# Patient Record
Sex: MALE | Race: WHITE | NOT HISPANIC OR LATINO | Employment: UNEMPLOYED | URBAN - NONMETROPOLITAN AREA
[De-identification: names, ages, dates, MRNs, and addresses within clinical notes are randomized per-mention and may not be internally consistent; named-entity substitution may affect disease eponyms.]

---

## 2019-04-12 ENCOUNTER — OFFICE VISIT (OUTPATIENT)
Dept: FAMILY MEDICINE CLINIC | Facility: CLINIC | Age: 54
End: 2019-04-12

## 2019-04-12 VITALS
HEART RATE: 71 BPM | HEIGHT: 72 IN | RESPIRATION RATE: 16 BRPM | OXYGEN SATURATION: 98 % | WEIGHT: 188.9 LBS | TEMPERATURE: 97.1 F | SYSTOLIC BLOOD PRESSURE: 133 MMHG | BODY MASS INDEX: 25.58 KG/M2 | DIASTOLIC BLOOD PRESSURE: 78 MMHG

## 2019-04-12 DIAGNOSIS — G89.4 CHRONIC PAIN SYNDROME: Chronic | ICD-10-CM

## 2019-04-12 DIAGNOSIS — Z13.220 SCREENING FOR HYPERLIPIDEMIA: ICD-10-CM

## 2019-04-12 DIAGNOSIS — M54.41 CHRONIC MIDLINE LOW BACK PAIN WITH BILATERAL SCIATICA: Primary | Chronic | ICD-10-CM

## 2019-04-12 DIAGNOSIS — Z76.89 ESTABLISHING CARE WITH NEW DOCTOR, ENCOUNTER FOR: ICD-10-CM

## 2019-04-12 DIAGNOSIS — M43.22 CERVICAL VERTEBRAL FUSION: Chronic | ICD-10-CM

## 2019-04-12 DIAGNOSIS — F41.1 GENERALIZED ANXIETY DISORDER: ICD-10-CM

## 2019-04-12 DIAGNOSIS — G89.29 CHRONIC MIDLINE LOW BACK PAIN WITH BILATERAL SCIATICA: Primary | Chronic | ICD-10-CM

## 2019-04-12 DIAGNOSIS — M65.341 TRIGGER RING FINGER OF RIGHT HAND: Chronic | ICD-10-CM

## 2019-04-12 DIAGNOSIS — M47.819 OSTEOARTHRITIS OF SPINE AT MULTIPLE LEVELS: Chronic | ICD-10-CM

## 2019-04-12 DIAGNOSIS — Z12.5 SCREENING FOR PROSTATE CANCER: ICD-10-CM

## 2019-04-12 DIAGNOSIS — M54.42 CHRONIC MIDLINE LOW BACK PAIN WITH BILATERAL SCIATICA: Primary | Chronic | ICD-10-CM

## 2019-04-12 PROCEDURE — 96372 THER/PROPH/DIAG INJ SC/IM: CPT | Performed by: NURSE PRACTITIONER

## 2019-04-12 PROCEDURE — 99214 OFFICE O/P EST MOD 30 MIN: CPT | Performed by: NURSE PRACTITIONER

## 2019-04-12 RX ORDER — ETODOLAC 200 MG/1
200 CAPSULE ORAL EVERY 8 HOURS PRN
Qty: 90 CAPSULE | Refills: 2 | Status: SHIPPED | OUTPATIENT
Start: 2019-04-12 | End: 2020-12-15

## 2019-04-12 RX ORDER — METHYLPREDNISOLONE ACETATE 80 MG/ML
80 INJECTION, SUSPENSION INTRA-ARTICULAR; INTRALESIONAL; INTRAMUSCULAR; SOFT TISSUE ONCE
Status: COMPLETED | OUTPATIENT
Start: 2019-04-12 | End: 2019-04-12

## 2019-04-12 RX ORDER — KETOROLAC TROMETHAMINE 30 MG/ML
60 INJECTION, SOLUTION INTRAMUSCULAR; INTRAVENOUS ONCE
Status: COMPLETED | OUTPATIENT
Start: 2019-04-12 | End: 2019-04-12

## 2019-04-12 RX ADMIN — METHYLPREDNISOLONE ACETATE 80 MG: 80 INJECTION, SUSPENSION INTRA-ARTICULAR; INTRALESIONAL; INTRAMUSCULAR; SOFT TISSUE at 09:59

## 2019-04-12 RX ADMIN — KETOROLAC TROMETHAMINE 60 MG: 30 INJECTION, SOLUTION INTRAMUSCULAR; INTRAVENOUS at 09:58

## 2019-04-12 NOTE — PATIENT INSTRUCTIONS
Back xray and other labs at your convenience in this next week.    See me in 2 weeks for follow up.      You will receive a call for appointment with an orthopedist.

## 2019-04-12 NOTE — PROGRESS NOTES
"Chief Complaint   Patient presents with   • Establish Care     Hand pain, nerve issues lower back     Subjective   Serafin Nash is a 54 y.o. male who presents to the office to establish care. He presents with pain and limited ROM of fingers of right hand. Also with chronic lower back pain.   He admits he has smoked some marijuana as self medication for back pain. This is not doing the job and he is seeking chronic pain management. He admits that a drug screen today will show meth, which he uses three times per week to \"loosen up his joints\".  He is advised this is a disqualifier for controlled meds and wishes to proceed anyway with conservative care.       The following portions of the patient's history were reviewed and updated as appropriate: allergies, current medications, past family history, past medical history, past social history, past surgical history and problem list.    History of Present Illness     Trigger finger: Right hand pain onset nontraumatic about 5 months ago  right 4 th finger with trigger finger contracture and loss of ROM. He is a  in [employer] and uses his hands all shift, and this is interfering with  and function. That finger alone does hurt when he moves it. No redness, no swelling, no bony abnormality is seen. Pain is rated about 5/10.       Lower back pain:  Onset 18 months ago midline lumbar back, no particular spinal tenderness to palpation. This pain is constant. Worsened by twisting, lifting. Worsened by actions required of mopping. About 12 months ago began also to have bilateral sciatica. This shooting pain originates in mid back bilaterally and runs straight down the buttock and down the back of each thigh to knee. This pain is worsened sometimes by sitting.  Pain is rated at 7/10.    Past Medical History:   Diagnosis Date   • Anxiety    • Arthritis    • Depression    • GERD (gastroesophageal reflux disease)    • Headache    • Low back pain           Family History " "  Problem Relation Age of Onset   • Arthritis Mother    • Asthma Mother    • Diabetes Mother    • Hypertension Mother    • Alcohol abuse Father    • Hypertension Father    • Anxiety disorder Sister    • Arthritis Sister    • Asthma Sister    • Mental illness Sister    • Alcohol abuse Brother    • Hypertension Brother    • Stroke Brother    • Stroke Maternal Grandmother         Review of Systems   Constitutional: Negative.  Negative for fever and unexpected weight change.   HENT: Negative.    Eyes: Negative.    Respiratory: Negative.  Negative for cough, chest tightness and shortness of breath.    Cardiovascular: Negative.  Negative for chest pain.   Gastrointestinal: Negative.    Endocrine: Negative.    Genitourinary: Negative.  Negative for dysuria.   Musculoskeletal: Positive for arthralgias (right 4th finger), back pain (lumbar) and neck stiffness (fused C5-6).   Skin: Negative.  Negative for color change, pallor, rash and wound.   Allergic/Immunologic: Negative.    Neurological: Negative.    Hematological: Negative.    Psychiatric/Behavioral: Negative.  Negative for sleep disturbance and suicidal ideas.   All other systems reviewed and are negative.      Objective   Vitals:    04/12/19 0839   BP: 133/78   BP Location: Left arm   Patient Position: Sitting   Cuff Size: Adult   Pulse: 71   Resp: 16   Temp: 97.1 °F (36.2 °C)   TempSrc: Oral   SpO2: 98%   Weight: 85.7 kg (188 lb 14.4 oz)   Height: 181.6 cm (71.5\")   PainSc:   7   PainLoc: Generalized     Physical Exam   Constitutional: He is oriented to person, place, and time. He appears well-developed and well-nourished.   HENT:   Head: Normocephalic and atraumatic.   Eyes: Conjunctivae are normal. Pupils are equal, round, and reactive to light.   Neck: Normal range of motion. Neck supple.   Cardiovascular: Normal rate, regular rhythm, normal heart sounds and intact distal pulses. Exam reveals no gallop and no friction rub.   No murmur heard.  Pulmonary/Chest: " Effort normal and breath sounds normal. No respiratory distress. He has no wheezes. He has no rales. He exhibits no tenderness.   Abdominal: Soft. Bowel sounds are normal.   Musculoskeletal: Normal range of motion. He exhibits no edema, tenderness or deformity.   Lymphadenopathy:     He has no cervical adenopathy.   Neurological: He is alert and oriented to person, place, and time.   Skin: Skin is warm and dry. Capillary refill takes 2 to 3 seconds. No erythema. No pallor.   Psychiatric: He has a normal mood and affect. His behavior is normal. Judgment and thought content normal.   Nursing note and vitals reviewed.      Assessment/Plan   Serafin was seen today for establish care.    Diagnoses and all orders for this visit:    Chronic midline low back pain with bilateral sciatica  Comments:  onset 18 months ago, nontraumatic, osetoarthritis and DDD, further workup planned  Orders:  -     XR Spine Lumbar 4+ View  -     methylPREDNISolone acetate (DEPO-medrol) injection 80 mg  -     ketorolac (TORADOL) injection 60 mg    Trigger ring finger of right hand  Comments:  onset 5 months ago, needs ortho referral today.  Orders:  -     Ambulatory Referral to Orthopedic Surgery    Chronic pain syndrome  Comments:  onset over 18 months ago.    Osteoarthritis of spine at multiple levels  Comments:  onset over 18 months ago. DDD  Orders:  -     XR Spine Lumbar 4+ View    Cervical vertebral fusion  Comments:  2012    Establishing care with new doctor, encounter for  -     CBC & Differential  -     Comprehensive Metabolic Panel  -     Urine Drug Screen - Urine, Clean Catch    Generalized anxiety disorder  -     T4, Free  -     TSH  -     T3    Screening for prostate cancer  -     PSA Screen    Screening for hyperlipidemia  -     Lipid Panel    Other orders  -     etodolac (LODINE) 200 MG capsule; Take 1 capsule by mouth Every 8 (Eight) Hours As Needed (mild/mod back pain).           PHQ-2/PHQ-9 Depression Screening 4/12/2019   Little  interest or pleasure in doing things 1   Feeling down, depressed, or hopeless 1   Trouble falling or staying asleep, or sleeping too much 0   Feeling tired or having little energy 3   Poor appetite or overeating 3   Feeling bad about yourself - or that you are a failure or have let yourself or your family down 0   Trouble concentrating on things, such as reading the newspaper or watching television 0   Moving or speaking so slowly that other people could have noticed. Or the opposite - being so fidgety or restless that you have been moving around a lot more than usual 0   Thoughts that you would be better off dead, or of hurting yourself in some way 0   Total Score 8   If you checked off any problems, how difficult have these problems made it for you to do your work, take care of things at home, or get along with other people? Not difficult at all       IGNACIA Coleman         Return in about 1 week (around 4/19/2019) for Next scheduled follow up.    Patient Instructions   Back xray and other labs at your convenience in this next week.    See me in 2 weeks for follow up.      You will receive a call for appointment with an orthopedist.

## 2019-05-07 DIAGNOSIS — R52 PAIN: Primary | ICD-10-CM

## 2019-05-08 ENCOUNTER — OFFICE VISIT (OUTPATIENT)
Dept: ORTHOPEDIC SURGERY | Facility: CLINIC | Age: 54
End: 2019-05-08

## 2019-05-08 VITALS — WEIGHT: 183 LBS | BODY MASS INDEX: 24.79 KG/M2 | HEIGHT: 72 IN

## 2019-05-08 DIAGNOSIS — M65.341 TRIGGER RING FINGER OF RIGHT HAND: Primary | ICD-10-CM

## 2019-05-08 DIAGNOSIS — R20.0 NUMBNESS AND TINGLING IN BOTH HANDS: ICD-10-CM

## 2019-05-08 DIAGNOSIS — F17.210 HEAVY CIGARETTE SMOKER (20-39 PER DAY): ICD-10-CM

## 2019-05-08 DIAGNOSIS — R20.2 NUMBNESS AND TINGLING IN BOTH HANDS: ICD-10-CM

## 2019-05-08 DIAGNOSIS — M79.641 RIGHT HAND PAIN: ICD-10-CM

## 2019-05-08 PROCEDURE — 99203 OFFICE O/P NEW LOW 30 MIN: CPT | Performed by: ORTHOPAEDIC SURGERY

## 2019-05-08 PROCEDURE — 20550 NJX 1 TENDON SHEATH/LIGAMENT: CPT | Performed by: ORTHOPAEDIC SURGERY

## 2019-05-08 RX ORDER — IBUPROFEN 800 MG/1
800 TABLET ORAL EVERY 6 HOURS PRN
COMMUNITY
End: 2019-05-09 | Stop reason: SDUPTHER

## 2019-05-08 RX ORDER — LIDOCAINE 5% 5 G/100G
CREAM TOPICAL
COMMUNITY
End: 2020-12-15

## 2019-05-08 NOTE — PROGRESS NOTES
Serafin Nash is a 54 y.o. male   Primary provider:  Hollie Walden APRN       Chief Complaint   Patient presents with   • Right Hand - trigger finger     Ring finger.        HISTORY OF PRESENT ILLNESS: patient referred by Hollie Walden for right ring trigger finger. xrays done today. No previous treatment.   Patient states that finger started locking up about 7 months ago is gradually getting worse. Patient has numbness and tingling in hands.  No specific injury  Pain with gripping  Locking with increased activity  He wears a glove for compression - which helps to prevent the locking.  Pain worsening for about 7 months.    Has some general numbness and tingling in both hands      Upper Extremity Issue   This is a chronic problem. The current episode started more than 1 month ago. The problem occurs constantly. The problem has been unchanged. Associated symptoms include numbness. Associated symptoms comments: Grinding, stabbing, aching, burning, clicking, swelling. . Exacerbated by: driving.  He has tried NSAIDs for the symptoms.        CONCURRENT MEDICAL HISTORY:    Past Medical History:   Diagnosis Date   • Alcohol abuse    • Anxiety    • Arthritis    • Depression    • GERD (gastroesophageal reflux disease)    • Headache    • Low back pain    • Stomach ulcer        No Known Allergies      Current Outpatient Medications:   •  Lidocaine 5 % cream, Apply  topically., Disp: , Rfl:   •  etodolac (LODINE) 200 MG capsule, Take 1 capsule by mouth Every 8 (Eight) Hours As Needed (mild/mod back pain)., Disp: 90 capsule, Rfl: 2  •  ibuprofen (ADVIL,MOTRIN) 800 MG tablet, Take 1 tablet by mouth Every 6 (Six) Hours As Needed for Mild Pain ., Disp: 90 tablet, Rfl: 5    Past Surgical History:   Procedure Laterality Date   • APPENDECTOMY     • FOOT SURGERY     • NECK SURGERY  2012   • THUMB CARPOMETACARPAL JOINT ARTHROPLASTY FLEXOR CARPI RADIALIS TENDON     • TONSILLECTOMY         Family History   Problem Relation Age of  "Onset   • Arthritis Mother    • Asthma Mother    • Diabetes Mother    • Hypertension Mother    • Alcohol abuse Father    • Hypertension Father    • Anxiety disorder Sister    • Arthritis Sister    • Asthma Sister    • Mental illness Sister    • Alcohol abuse Brother    • Hypertension Brother    • Stroke Brother    • Stroke Maternal Grandmother         Social History     Socioeconomic History   • Marital status: Single     Spouse name: Not on file   • Number of children: Not on file   • Years of education: Not on file   • Highest education level: Not on file   Tobacco Use   • Smoking status: Current Every Day Smoker     Packs/day: 1.00   • Smokeless tobacco: Never Used   Substance and Sexual Activity   • Alcohol use: Yes     Alcohol/week: 7.2 oz     Types: 12 Cans of beer per week   • Drug use: No   • Sexual activity: Defer        Review of Systems   Constitutional: Positive for unexpected weight change.   Eyes: Positive for visual disturbance.   Cardiovascular: Positive for palpitations.   Neurological: Positive for numbness.   Psychiatric/Behavioral: Positive for dysphoric mood and sleep disturbance. The patient is nervous/anxious.    All other systems reviewed and are negative.      PHYSICAL EXAMINATION:       Ht 181.6 cm (71.5\")   Wt 83 kg (183 lb)   BMI 25.17 kg/m²     Physical Exam   Constitutional: He is oriented to person, place, and time. He appears well-developed and well-nourished.   Neurological: He is alert and oriented to person, place, and time.   Psychiatric: He has a normal mood and affect. His behavior is normal. Judgment and thought content normal.       GAIT:     [x]  Normal  []  Antalgic    Assistive device: [x]  None  []  Walker     []  Crutches  []  Cane     []  Wheelchair  []  Stretcher    Right Hand Exam     Tenderness   Right hand tenderness location: tender volar aspect mcp joint 4th finger.    Muscle Strength   : 4/5     Other   Erythema: absent  Sensation: normal  Pulse: " present    Comments:  Active locking of 4th finger with full flexion.      Left Hand Exam     Tenderness   The patient is experiencing no tenderness.     Range of Motion   The patient has normal left wrist ROM.    Muscle Strength   The patient has normal left wrist strength.    Other   Erythema: absent  Sensation: normal  Pulse: present              Xr Hand 3+ View Right    Result Date: 5/8/2019  Narrative: Ordering Provider:  Donovan Joya MD Ordering Diagnosis/Indication:  Pain Procedure:  XR HAND 3+ VW RIGHT Exam Date:  5/8/19 COMPARISON:  Not applicable, no relevant images available.     Impression:  3 views of the right hand show appropriate position and alignment of the hand with no evidence of acute bony abnormality.  No fracture or dislocation is noted.  Minimal arthritic changes noted diffusely throughout the hand. Donovan Joya MD 5/8/19     Xr Spine Lumbar 4+ View    Result Date: 5/8/2019  Narrative: PROCEDURE: Lumbar spine 5 views REASON FOR EXAM: Low Back Pain, M54.41 Lumbago with sciatica, right side M54.42 Lumbago with sciatica, left side G89.29 Other chronic pain M47.819 Spondylosis without myelopathy or radiculopathy, site unspecified FINDINGS: . Lumbar spine vertebral body heights and alignment are normal. There is no evidence of fracture or dislocation.Intervertebral disc spaces are intact.     Impression: 1. No acute lumbar spine abnormality.. Electronically signed by:  Joshua De Leon MD  5/8/2019 1:50 PM CDT Workstation: [id number]          ASSESSMENT:    Diagnoses and all orders for this visit:    Trigger ring finger of right hand  -     Injection Tendon or Ligament    Right hand pain  -     Injection Tendon or Ligament    Heavy cigarette smoker (20-39 per day)    Numbness and tingling in both hands    Other orders  -     Discontinue: ibuprofen (ADVIL,MOTRIN) 800 MG tablet; Take 800 mg by mouth Every 6 (Six) Hours As Needed for Mild Pain .  -     Lidocaine 5 % cream; Apply   topically.          PLAN    I advised Serafin of the risks of continuing to use tobacco.    During this visit, I spent 4 minutes counseling the patient regarding tobacco cessation.      Discussed injection into the flexor tendon sheath of 4th finger.    Activity as tolerated    Possible trigger release.    Possible EMG for eval of numbness and tingling but also discussed that most of these symptoms can be related to smoking and nicotine usage.        Injection Tendon or Ligament  Date/Time: 5/8/2019 10:24 AM  Performed by: Donovan Joya MD  Authorized by: Donovan Joya MD   Preparation: Patient was prepped and draped in the usual sterile fashion.  Local anesthesia used: no    Anesthesia:  Local anesthesia used: no    Sedation:  Patient sedated: no    Patient tolerance: Patient tolerated the procedure well with no immediate complications  Comments: .3cc 1% Lidocaine and .3cc kenalog injected into right ring finger  Lidocaine ndc 5900-5476-78 lot -dk  kenalong ndc 6154-9554-62 lot [id number]            Patient's Body mass index is 25.17 kg/m². BMI is within normal parameters. No follow-up required..    Return if symptoms worsen or fail to improve, for recheck.    Donovan Joya MD

## 2019-05-09 ENCOUNTER — TELEPHONE (OUTPATIENT)
Dept: FAMILY MEDICINE CLINIC | Facility: CLINIC | Age: 54
End: 2019-05-09

## 2019-05-09 RX ORDER — IBUPROFEN 800 MG/1
800 TABLET ORAL EVERY 6 HOURS PRN
Qty: 90 TABLET | Refills: 5 | Status: SHIPPED | OUTPATIENT
Start: 2019-05-09 | End: 2019-06-07

## 2019-05-09 NOTE — TELEPHONE ENCOUNTER
----- Message from IGNACIA Tafoya sent at 5/9/2019 12:53 PM CDT -----  Please notify pt that his lumbar back xray was normal, thanks clw

## 2020-12-15 ENCOUNTER — LAB (OUTPATIENT)
Dept: LAB | Facility: OTHER | Age: 55
End: 2020-12-15

## 2020-12-15 ENCOUNTER — OFFICE VISIT (OUTPATIENT)
Dept: FAMILY MEDICINE CLINIC | Facility: CLINIC | Age: 55
End: 2020-12-15

## 2020-12-15 VITALS
OXYGEN SATURATION: 100 % | HEIGHT: 71 IN | HEART RATE: 110 BPM | TEMPERATURE: 97.4 F | RESPIRATION RATE: 18 BRPM | DIASTOLIC BLOOD PRESSURE: 82 MMHG | WEIGHT: 222 LBS | BODY MASS INDEX: 31.08 KG/M2 | SYSTOLIC BLOOD PRESSURE: 158 MMHG

## 2020-12-15 DIAGNOSIS — I10 BENIGN ESSENTIAL HTN: ICD-10-CM

## 2020-12-15 DIAGNOSIS — M54.2 CHRONIC MIDLINE POSTERIOR NECK PAIN: Chronic | ICD-10-CM

## 2020-12-15 DIAGNOSIS — Z13.1 SCREENING FOR DIABETES MELLITUS: ICD-10-CM

## 2020-12-15 DIAGNOSIS — G89.29 CHRONIC BILATERAL LOW BACK PAIN WITH BILATERAL SCIATICA: Chronic | ICD-10-CM

## 2020-12-15 DIAGNOSIS — G89.29 CHRONIC MIDLINE POSTERIOR NECK PAIN: Chronic | ICD-10-CM

## 2020-12-15 DIAGNOSIS — Z13.220 SCREENING, LIPID: ICD-10-CM

## 2020-12-15 DIAGNOSIS — Z98.1 HISTORY OF FUSION OF CERVICAL SPINE: ICD-10-CM

## 2020-12-15 DIAGNOSIS — Z13.21 ENCOUNTER FOR VITAMIN DEFICIENCY SCREENING: ICD-10-CM

## 2020-12-15 DIAGNOSIS — G89.29 CHRONIC FOOT PAIN, RIGHT: ICD-10-CM

## 2020-12-15 DIAGNOSIS — M25.562 CHRONIC PAIN OF LEFT KNEE: ICD-10-CM

## 2020-12-15 DIAGNOSIS — Z02.83 ENCOUNTER FOR DRUG SCREENING: ICD-10-CM

## 2020-12-15 DIAGNOSIS — Z00.00 ENCOUNTER FOR ANNUAL HEALTH EXAMINATION: ICD-10-CM

## 2020-12-15 DIAGNOSIS — Z13.29 SCREENING FOR THYROID DISORDER: ICD-10-CM

## 2020-12-15 DIAGNOSIS — G89.29 CHRONIC PAIN OF LEFT KNEE: ICD-10-CM

## 2020-12-15 DIAGNOSIS — M15.9 OSTEOARTHRITIS INVOLVING MULTIPLE JOINTS ON BOTH SIDES OF BODY: ICD-10-CM

## 2020-12-15 DIAGNOSIS — F17.210 CIGARETTE NICOTINE DEPENDENCE WITHOUT COMPLICATION: Primary | Chronic | ICD-10-CM

## 2020-12-15 DIAGNOSIS — M54.41 CHRONIC BILATERAL LOW BACK PAIN WITH BILATERAL SCIATICA: Chronic | ICD-10-CM

## 2020-12-15 DIAGNOSIS — Z12.5 SCREENING FOR MALIGNANT NEOPLASM OF PROSTATE: ICD-10-CM

## 2020-12-15 DIAGNOSIS — R89.2 ABNORMAL DRUG SCREEN: ICD-10-CM

## 2020-12-15 DIAGNOSIS — M54.42 CHRONIC BILATERAL LOW BACK PAIN WITH BILATERAL SCIATICA: Chronic | ICD-10-CM

## 2020-12-15 DIAGNOSIS — M79.671 CHRONIC FOOT PAIN, RIGHT: ICD-10-CM

## 2020-12-15 LAB
ALBUMIN SERPL-MCNC: 4.3 G/DL (ref 3.5–5)
ALBUMIN/GLOB SERPL: 1.3 G/DL (ref 1.1–1.8)
ALP SERPL-CCNC: 78 U/L (ref 38–126)
ALT SERPL W P-5'-P-CCNC: 92 U/L
ANION GAP SERPL CALCULATED.3IONS-SCNC: 7 MMOL/L (ref 5–15)
AST SERPL-CCNC: 60 U/L (ref 17–59)
BILIRUB SERPL-MCNC: 1.1 MG/DL (ref 0.2–1.3)
BUN SERPL-MCNC: 11 MG/DL (ref 7–23)
BUN/CREAT SERPL: 13.8 (ref 7–25)
CALCIUM SPEC-SCNC: 10 MG/DL (ref 8.4–10.2)
CHLORIDE SERPL-SCNC: 101 MMOL/L (ref 101–112)
CHOLEST SERPL-MCNC: 191 MG/DL (ref 150–200)
CO2 SERPL-SCNC: 29 MMOL/L (ref 22–30)
CREAT SERPL-MCNC: 0.8 MG/DL (ref 0.7–1.3)
DEPRECATED RDW RBC AUTO: 43.8 FL (ref 37–54)
EOSINOPHIL # BLD MANUAL: 0.59 10*3/MM3 (ref 0–0.4)
EOSINOPHIL NFR BLD MANUAL: 6 % (ref 0.3–6.2)
ERYTHROCYTE [DISTWIDTH] IN BLOOD BY AUTOMATED COUNT: 13.2 % (ref 12.3–15.4)
GFR SERPL CREATININE-BSD FRML MDRD: 100 ML/MIN/1.73 (ref 56–130)
GLOBULIN UR ELPH-MCNC: 3.3 GM/DL (ref 2.3–3.5)
GLUCOSE SERPL-MCNC: 120 MG/DL (ref 70–99)
HCT VFR BLD AUTO: 49.8 % (ref 37.5–51)
HDLC SERPL-MCNC: 47 MG/DL (ref 40–59)
HGB BLD-MCNC: 16.7 G/DL (ref 13–17.7)
LDLC SERPL CALC-MCNC: 119 MG/DL
LDLC/HDLC SERPL: 2.46 {RATIO} (ref 0–3.55)
LYMPHOCYTES # BLD MANUAL: 2.58 10*3/MM3 (ref 0.7–3.1)
LYMPHOCYTES NFR BLD MANUAL: 14 % (ref 5–12)
LYMPHOCYTES NFR BLD MANUAL: 26 % (ref 19.6–45.3)
MCH RBC QN AUTO: 30.8 PG (ref 26.6–33)
MCHC RBC AUTO-ENTMCNC: 33.5 G/DL (ref 31.5–35.7)
MCV RBC AUTO: 91.9 FL (ref 79–97)
MONOCYTES # BLD AUTO: 1.39 10*3/MM3 (ref 0.1–0.9)
NEUTROPHILS # BLD AUTO: 5.25 10*3/MM3 (ref 1.7–7)
NEUTROPHILS NFR BLD MANUAL: 53 % (ref 42.7–76)
PLATELET # BLD AUTO: 257 10*3/MM3 (ref 140–450)
PMV BLD AUTO: 10.8 FL (ref 6–12)
POTASSIUM SERPL-SCNC: 4.2 MMOL/L (ref 3.4–5)
PROT SERPL-MCNC: 7.6 G/DL (ref 6.3–8.6)
RBC # BLD AUTO: 5.42 10*6/MM3 (ref 4.14–5.8)
RBC MORPH BLD: NORMAL
SMALL PLATELETS BLD QL SMEAR: ADEQUATE
SODIUM SERPL-SCNC: 137 MMOL/L (ref 137–145)
TRIGL SERPL-MCNC: 143 MG/DL
VARIANT LYMPHS NFR BLD MANUAL: 1 % (ref 0–5)
VLDLC SERPL-MCNC: 25 MG/DL (ref 5–40)
WBC # BLD AUTO: 9.91 10*3/MM3 (ref 3.4–10.8)
WBC MORPH BLD: NORMAL

## 2020-12-15 PROCEDURE — 84443 ASSAY THYROID STIM HORMONE: CPT | Performed by: NURSE PRACTITIONER

## 2020-12-15 PROCEDURE — 82306 VITAMIN D 25 HYDROXY: CPT | Performed by: NURSE PRACTITIONER

## 2020-12-15 PROCEDURE — G0481 DRUG TEST DEF 8-14 CLASSES: HCPCS | Performed by: NURSE PRACTITIONER

## 2020-12-15 PROCEDURE — 84439 ASSAY OF FREE THYROXINE: CPT | Performed by: NURSE PRACTITIONER

## 2020-12-15 PROCEDURE — 80307 DRUG TEST PRSMV CHEM ANLYZR: CPT | Performed by: NURSE PRACTITIONER

## 2020-12-15 PROCEDURE — 83036 HEMOGLOBIN GLYCOSYLATED A1C: CPT | Performed by: NURSE PRACTITIONER

## 2020-12-15 PROCEDURE — 82607 VITAMIN B-12: CPT | Performed by: NURSE PRACTITIONER

## 2020-12-15 PROCEDURE — 80061 LIPID PANEL: CPT | Performed by: NURSE PRACTITIONER

## 2020-12-15 PROCEDURE — 36415 COLL VENOUS BLD VENIPUNCTURE: CPT | Performed by: NURSE PRACTITIONER

## 2020-12-15 PROCEDURE — 99214 OFFICE O/P EST MOD 30 MIN: CPT | Performed by: NURSE PRACTITIONER

## 2020-12-15 PROCEDURE — 85025 COMPLETE CBC W/AUTO DIFF WBC: CPT | Performed by: NURSE PRACTITIONER

## 2020-12-15 PROCEDURE — 84480 ASSAY TRIIODOTHYRONINE (T3): CPT | Performed by: NURSE PRACTITIONER

## 2020-12-15 PROCEDURE — G0103 PSA SCREENING: HCPCS | Performed by: NURSE PRACTITIONER

## 2020-12-15 PROCEDURE — 80053 COMPREHEN METABOLIC PANEL: CPT | Performed by: NURSE PRACTITIONER

## 2020-12-15 RX ORDER — METOPROLOL SUCCINATE 25 MG/1
25 TABLET, EXTENDED RELEASE ORAL DAILY
Qty: 30 TABLET | Refills: 5 | Status: SHIPPED | OUTPATIENT
Start: 2020-12-15 | End: 2022-04-26 | Stop reason: SDUPTHER

## 2020-12-15 NOTE — PROGRESS NOTES
Chief Complaint   Patient presents with   • Back Pain     nerve pain that traveles to bothe feet   • Neck Pain     Subjective   Serafin Nash is a 55 y.o. male who presents to the office for routine follow-up of chronic neck and back pain managed with Lodine.     The following portions of the patient's history were reviewed and updated as appropriate: allergies, current medications, past family history, past medical history, past social history, past surgical history and problem list.    History of Present Illness   Patient has been lost to follow up for over a year. Due to Covid pandemic his job was eliminated. He is now interested in seeking better health. He stopped using meth several months ago, he reports.     HTN: in office today bp is 158/82, . No previous diagnosis of HTN and never been on antihypertensives.    Chronic lower back pain:  Onset over 5  years ago midline lumbar back, planes of bilateral sciatica.  This pain is constant. Worsened by twisting, lifting. Worsened by actions required of driving as well.  This shooting pain originates in mid back bilaterally and runs straight down the buttock and down the back of each thigh to knee. This pain is worsened sometimes by sitting.  Pain is rated at 6 of 10 today.  Usual treatment for his pain is over-the-counter NSAIDs. Reports mild to moderate relief with use of NSAID.    Chronic posterior cervical neck pain.  Onset prior to 2020, required anterior cervical fusion, brings films today demonstrating a plate in mid cervical spine, image date not identifiable. Has some discs as well which he is advised to keep unless an MRI is ordered here at some point, at which they will prove helpful..  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  Pain is intermittent. Exacerbated by twisting.     Intermittent bilateral and alternating knee pain. Left knee is hurting now for past 2 days, no specific injury. Pain is  subpatellar. No swelling, extraordinary heat nor redness. Crepitus felt both knees.    Chronic right foot pain onset before 2007. Foot surgery 2007, records recieved and placed in file today. Has had chronic pain since and requires orthotics. There was significant bone deformity noted.    New complaints today:none.    Parts of most recent relevant visit HPI, ROS  and PE may be carried forward and all are updated as appropriate for current situation.    Past Medical History:   Diagnosis Date   • Alcohol abuse    • Anxiety    • Arthritis    • Depression    • GERD (gastroesophageal reflux disease)    • Headache    • Low back pain    • Stomach ulcer           Family History   Problem Relation Age of Onset   • Arthritis Mother    • Asthma Mother    • Diabetes Mother    • Hypertension Mother    • Alcohol abuse Father    • Hypertension Father    • Anxiety disorder Sister    • Arthritis Sister    • Asthma Sister    • Mental illness Sister    • Alcohol abuse Brother    • Hypertension Brother    • Stroke Brother    • Stroke Maternal Grandmother         Review of Systems   Constitutional: Negative.  Negative for fever and unexpected weight change.   HENT: Negative.    Eyes: Negative.    Respiratory: Negative.  Negative for cough, chest tightness and shortness of breath.    Cardiovascular: Negative.  Negative for chest pain.   Gastrointestinal: Negative.    Endocrine: Negative.    Genitourinary: Negative.  Negative for dysuria.   Musculoskeletal: Positive for arthralgias (right 4th finger), back pain (lumbar) and neck stiffness (fused C5-6).   Skin: Negative.  Negative for color change, pallor, rash and wound.   Allergic/Immunologic: Negative.    Neurological: Negative.    Hematological: Negative.    Psychiatric/Behavioral: Negative.  Negative for sleep disturbance and suicidal ideas.   All other systems reviewed and are negative.      Objective   Vitals:    12/15/20 1406   BP: 158/82   Pulse: 110   Resp: 18   Temp: 97.4 °F  "(36.3 °C)   SpO2: 100%   Weight: 101 kg (222 lb)   Height: 180.3 cm (71\")   PainSc:   6   PainLoc: Back  Comment: + hips     Physical Exam  Vitals signs and nursing note reviewed.   Constitutional:       General: He is not in acute distress.  Pulmonary:      Effort: Pulmonary effort is normal. No respiratory distress.      Breath sounds: No stridor.   Neurological:      Mental Status: He is alert and oriented to person, place, and time.   Psychiatric:         Mood and Affect: Mood normal.         Behavior: Behavior normal.         Thought Content: Thought content normal.         Judgment: Judgment normal.         Assessment/Plan   Diagnoses and all orders for this visit:    1. Cigarette nicotine dependence without complication (Primary)    2. Chronic bilateral low back pain with bilateral sciatica  -     XR spine lumbar 2 or 3 vw    3. Chronic midline posterior neck pain  -     XR Spine Cervical 2 or 3 View    4. Screening for malignant neoplasm of prostate  -     PSA Screen    5. Encounter for vitamin deficiency screening  -     Vitamin D 25 hydroxy; Future  -     Vitamin B12; Future    6. Screening for diabetes mellitus  -     Hemoglobin A1c    7. Screening for thyroid disorder  -     T4, Free  -     TSH  -     T3    8. Screening, lipid  -     Lipid Panel    9. Encounter for annual health examination  -     CBC & Differential  -     Comprehensive Metabolic Panel  -     CBC Auto Differential    10. Chronic pain of left knee  -     XR Knee 3 View Left    11. Chronic foot pain, right  -     XR Foot 3+ View Right    12. History of fusion of cervical spine  -     XR Spine Cervical 2 or 3 View    13. Abnormal drug screen  -     Cancel: ToxASSURE Select 13 Discrete -    14. Benign essential HTN  -     metoprolol succinate XL (TOPROL-XL) 25 MG 24 hr tablet; Take 1 tablet by mouth Daily.  Dispense: 30 tablet; Refill: 5    15. Osteoarthritis involving multiple joints on both sides of body  -     diclofenac (VOLTAREN) 50 MG " EC tablet; Take 1 tablet by mouth 2 (Two) Times a Day As Needed (arthritis pain).  Dispense: 60 tablet; Refill: 5    16. Encounter for drug screening  -     ToxASSURE Select 13 Discrete -           PHQ-2/PHQ-9 Depression Screening 12/15/2020   Little interest or pleasure in doing things 0   Feeling down, depressed, or hopeless 1   Trouble falling or staying asleep, or sleeping too much 0   Feeling tired or having little energy 0   Poor appetite or overeating 0   Feeling bad about yourself - or that you are a failure or have let yourself or your family down 0   Trouble concentrating on things, such as reading the newspaper or watching television 0   Moving or speaking so slowly that other people could have noticed. Or the opposite - being so fidgety or restless that you have been moving around a lot more than usual 0   Thoughts that you would be better off dead, or of hurting yourself in some way 0   Total Score 1   If you checked off any problems, how difficult have these problems made it for you to do your work, take care of things at home, or get along with other people? Somewhat difficult       IGNACIA Coleman         Return in about 2 weeks (around 12/29/2020).    There are no Patient Instructions on file for this visit.

## 2020-12-16 LAB
25(OH)D3 SERPL-MCNC: 16.4 NG/ML (ref 30–100)
HBA1C MFR BLD: 5.8 % (ref 4.8–5.6)
PSA SERPL-MCNC: 0.96 NG/ML (ref 0–4)
T3 SERPL-MCNC: 150 NG/DL (ref 80–200)
T4 FREE SERPL-MCNC: 0.9 NG/DL (ref 0.93–1.7)
TSH SERPL DL<=0.05 MIU/L-ACNC: 1.33 UIU/ML (ref 0.27–4.2)
VIT B12 BLD-MCNC: 249 PG/ML (ref 211–946)

## 2020-12-17 DIAGNOSIS — E55.9 VITAMIN D DEFICIENCY: Primary | ICD-10-CM

## 2020-12-17 NOTE — PROGRESS NOTES
Inform patient of low vitamin D level. Requires a weekly vitamin D supplement, prescription sent.  Repeat vitamin D level on approximately 3/17/2021.

## 2020-12-20 LAB
6MAM UR QL CFM: NEGATIVE
6MAM/CREAT UR: NOT DETECTED NG/MG CREAT
7AMINOCLONAZEPAM/CREAT UR: NOT DETECTED NG/MG CREAT
A-OH ALPRAZ/CREAT UR: NOT DETECTED NG/MG CREAT
A-OH-TRIAZOLAM/CREAT UR CFM: NOT DETECTED NG/MG CREAT
ALFENTANIL/CREAT UR CFM: NOT DETECTED NG/MG CREAT
ALPHA-HYDROXYMIDAZOLAM, URINE: NOT DETECTED NG/MG CREAT
ALPRAZ/CREAT UR CFM: NOT DETECTED NG/MG CREAT
AMOBARBITAL UR QL CFM: NOT DETECTED
AMPHET/CREAT UR: NOT DETECTED NG/MG CREAT
AMPHETAMINES UR QL CFM: NORMAL
BARBITAL UR QL CFM: NOT DETECTED
BARBITURATES UR QL CFM: NEGATIVE
BENZODIAZ UR QL CFM: NEGATIVE
BUPRENORPHINE UR QL CFM: NEGATIVE
BUPRENORPHINE/CREAT UR: NOT DETECTED NG/MG CREAT
BUTABARBITAL UR QL CFM: NOT DETECTED
BUTALBITAL UR QL CFM: NOT DETECTED
BZE/CREAT UR: NOT DETECTED NG/MG CREAT
CANNABINOIDS UR QL CFM: NORMAL
CARBOXYTHC/CREAT UR: 25 NG/MG CREAT
CLONAZEPAM/CREAT UR CFM: NOT DETECTED NG/MG CREAT
COCAETHYLENE/CREAT UR CFM: NOT DETECTED NG/MG CREAT
COCAINE UR QL CFM: NEGATIVE
COCAINE/CREAT UR CFM: NOT DETECTED NG/MG CREAT
CODEINE/CREAT UR: NOT DETECTED NG/MG CREAT
CREAT UR-MCNC: 204 MG/DL
DESALKYLFLURAZ/CREAT UR: NOT DETECTED NG/MG CREAT
DESMETHYLFLUNITRAZEPAM: NOT DETECTED NG/MG CREAT
DHC/CREAT UR: NOT DETECTED NG/MG CREAT
DIAZEPAM/CREAT UR: NOT DETECTED NG/MG CREAT
DRUGS UR: NORMAL
EDDP/CREAT UR: NOT DETECTED NG/MG CREAT
ETHANOL UR CFM-MCNC: NOT DETECTED G/DL
ETHANOL UR QL CFM: NEGATIVE
FENTANYL UR QL CFM: NEGATIVE
FENTANYL/CREAT UR: NOT DETECTED NG/MG CREAT
FLUNITRAZEPAM UR QL CFM: NOT DETECTED NG/MG CREAT
HYDROCODONE/CREAT UR: NOT DETECTED NG/MG CREAT
HYDROMORPHONE/CREAT UR: NOT DETECTED NG/MG CREAT
LEVEL OF DETECTION:: NORMAL
LORAZEPAM/CREAT UR: NOT DETECTED NG/MG CREAT
MDA/CREAT UR: NOT DETECTED NG/MG CREAT
MDMA/CREAT UR: NOT DETECTED NG/MG CREAT
MEPHOBARBITAL UR QL CFM: NOT DETECTED
METHADONE UR QL CFM: NEGATIVE
METHADONE/CREAT UR: NOT DETECTED NG/MG CREAT
METHAMPHET/CREAT UR: 46 NG/MG CREAT
MIDAZOLAM/CREAT UR CFM: NOT DETECTED NG/MG CREAT
MORPHINE/CREAT UR: NOT DETECTED NG/MG CREAT
N-NORTRAMADOL/CREAT UR CFM: NOT DETECTED NG/MG CREAT
NARCOTICS UR: NEGATIVE
NORBUPRENORPHINE/CREAT UR: NOT DETECTED NG/MG CREAT
NORCODEINE/CREAT UR CFM: NOT DETECTED NG/MG CREAT
NORDIAZEPAM/CREAT UR: NOT DETECTED NG/MG CREAT
NORFENTANYL/CREAT UR: NOT DETECTED NG/MG CREAT
NORHYDROCODONE/CREAT UR: NOT DETECTED NG/MG CREAT
NORMORPHINE UR-MCNC: NOT DETECTED NG/MG CREAT
NOROXYCODONE/CREAT UR: NOT DETECTED NG/MG CREAT
NOROXYMORPHONE/CREAT UR CFM: NOT DETECTED NG/MG CREAT
O-NORTRAMADOL UR CFM-MCNC: NOT DETECTED NG/MG CREAT
OPIATES UR QL CFM: NEGATIVE
OXAZEPAM/CREAT UR: NOT DETECTED NG/MG CREAT
OXYCODONE UR QL CFM: NEGATIVE
OXYCODONE/CREAT UR: NOT DETECTED NG/MG CREAT
OXYMORPHONE/CREAT UR: NOT DETECTED NG/MG CREAT
PENTOBARB UR QL CFM: NOT DETECTED
PHENOBARB UR QL CFM: NOT DETECTED
SECOBARBITAL UR QL CFM: NOT DETECTED
SUFENTANIL/CREAT UR CFM: NOT DETECTED NG/MG CREAT
TAPENTADOL UR QL CFM: NEGATIVE
TAPENTADOL/CREAT UR: NOT DETECTED NG/MG CREAT
TEMAZEPAM/CREAT UR: NOT DETECTED NG/MG CREAT
THIOPENTAL UR QL CFM: NOT DETECTED
TRAMADOL UR QL CFM: NOT DETECTED NG/MG CREAT

## 2020-12-29 ENCOUNTER — OFFICE VISIT (OUTPATIENT)
Dept: FAMILY MEDICINE CLINIC | Facility: CLINIC | Age: 55
End: 2020-12-29

## 2020-12-29 VITALS — HEIGHT: 71 IN | BODY MASS INDEX: 31.36 KG/M2 | WEIGHT: 224 LBS

## 2020-12-29 DIAGNOSIS — E78.00 ELEVATED LOW DENSITY LIPOPROTEIN (LDL) CHOLESTEROL LEVEL: ICD-10-CM

## 2020-12-29 DIAGNOSIS — M54.42 CHRONIC BILATERAL LOW BACK PAIN WITH BILATERAL SCIATICA: ICD-10-CM

## 2020-12-29 DIAGNOSIS — R73.03 PREDIABETES: ICD-10-CM

## 2020-12-29 DIAGNOSIS — R79.89 ELEVATED LFTS: ICD-10-CM

## 2020-12-29 DIAGNOSIS — M15.9 PRIMARY OSTEOARTHRITIS INVOLVING MULTIPLE JOINTS: ICD-10-CM

## 2020-12-29 DIAGNOSIS — M25.552 CHRONIC HIP PAIN, BILATERAL: ICD-10-CM

## 2020-12-29 DIAGNOSIS — R79.89 LOW SERUM T4 LEVEL: ICD-10-CM

## 2020-12-29 DIAGNOSIS — M25.562 CHRONIC PAIN OF LEFT KNEE: ICD-10-CM

## 2020-12-29 DIAGNOSIS — E55.9 VITAMIN D DEFICIENCY: ICD-10-CM

## 2020-12-29 DIAGNOSIS — G89.29 CHRONIC HIP PAIN, BILATERAL: ICD-10-CM

## 2020-12-29 DIAGNOSIS — G89.29 CHRONIC MIDLINE POSTERIOR NECK PAIN: ICD-10-CM

## 2020-12-29 DIAGNOSIS — M25.551 CHRONIC HIP PAIN, BILATERAL: ICD-10-CM

## 2020-12-29 DIAGNOSIS — E53.8 B12 DEFICIENCY: Primary | ICD-10-CM

## 2020-12-29 DIAGNOSIS — G89.29 CHRONIC BILATERAL LOW BACK PAIN WITH BILATERAL SCIATICA: ICD-10-CM

## 2020-12-29 DIAGNOSIS — M54.2 CHRONIC MIDLINE POSTERIOR NECK PAIN: ICD-10-CM

## 2020-12-29 DIAGNOSIS — M54.41 CHRONIC BILATERAL LOW BACK PAIN WITH BILATERAL SCIATICA: ICD-10-CM

## 2020-12-29 DIAGNOSIS — G89.29 CHRONIC PAIN OF LEFT KNEE: ICD-10-CM

## 2020-12-29 PROCEDURE — 99443 PR PHYS/QHP TELEPHONE EVALUATION 21-30 MIN: CPT | Performed by: NURSE PRACTITIONER

## 2020-12-29 RX ORDER — PANTOPRAZOLE SODIUM 40 MG/1
40 TABLET, DELAYED RELEASE ORAL DAILY
Qty: 30 TABLET | Refills: 5 | Status: SHIPPED | OUTPATIENT
Start: 2020-12-29 | End: 2022-05-24 | Stop reason: SDUPTHER

## 2020-12-29 RX ORDER — ATORVASTATIN CALCIUM 10 MG/1
10 TABLET, FILM COATED ORAL DAILY
Qty: 30 TABLET | Refills: 5 | Status: SHIPPED | OUTPATIENT
Start: 2020-12-29 | End: 2022-05-24 | Stop reason: SDUPTHER

## 2020-12-29 RX ORDER — MELOXICAM 15 MG/1
15 TABLET ORAL DAILY
Qty: 30 TABLET | Refills: 0 | Status: SHIPPED | OUTPATIENT
Start: 2020-12-29 | End: 2022-05-06

## 2020-12-29 NOTE — PROGRESS NOTES
Subjective   Serafin Nash is a 55 y.o. male.   You have chosen to receive care through a telephone visit. Do you consent to use a telephone visit for your medical care today? Yes  30 minutes medical discussion.     Chief Complaint   Patient presents with   • Follow-up     2 wk fu    • lab results     xray results  & UDS        History of Present Illness   Discussed labs from 12/15/20. Normal CBC, T3, TSH, PSA. CMP with elevated LFTs, lipoid with .A1C 5.8%. Vitamin D 16.4. B12 249. Other labs normal/ stable.   Initial screening UDS showed THC and methamphetamine. No controlled meds will be prescribed.     Hypertension: Prescribed Toprol 25 mg p.o. daily at last visit.  Today he reports stopped his Toprol.  Reports it dropped his blood pressure really low-100/56.  Explained that this is a normal blood pressure.  He was asymptomatic and did not feel bad with a blood pressure at that level.  He is encouraged and reassured to resume the medication and he states he will.    Chronic midline lower back pain with bilateral sciatica: Gradually worsening over time.  Onset more than 1 year ago.  Plain films do not demonstrate any abnormality.  Explained that insurance will require a 6-week treatment conservatively prior to MRI.  He states understanding.  Reports diclofenac is like taking a baby aspirin does not help at all.    Chronic left knee pain, gradually worsening over time.  Onset more than 1 year ago.  Plain film also does not demonstrate any degenerative joint disease of the knee.  Declines referral to orthopedics for second opinion.    Chronic cervical neck pain, gradually worsening over time.  History of multiple cervical vertebral fusions.  Plain film demonstrates anterior cervical fusion with multilevel degenerative disease and spurring.    Vitamin D deficiency identified on labs 12/15/2020 and cholecalciferol 50,000 units 1 every 7 days was prescribed at that time.  Patient reports pharmacy never  received the prescription so this is resent again today.    Vitamin B12 deficiency, new diagnosis from labs of 12/15/2020..  Oral B12 prescription is sent.    Overarching osteoarthritic changes of multiple joints affecting hips and neck at minimum fail improvement with treatment with diclofenac 50 mg EC tablets twice daily.  Agreeable to try Mobic 15 mg daily.  Educated on the fact that prescription strength NSAIDs can lead to NSAID induced gastric ulceration, and PPI therapy is required.  Patient agrees to this as well.    Risk of CAD: Male gender, obesity, age greater than 55, hypertension, , family history.  Patient states has had 2 AMIs in his 20s related to cocaine use.  There is no recent or current stress test EKG or echocardiogram on file.  He does not take a daily baby aspirin he is not on statin therapy.  He is on a beta-blocker.    Prediabetes: New diagnosis with 12/15/2020 labs and A1c of 5.8%.  He is not interested in taking Metformin at this time we will retest with dietary changes over the next 3 months, obtaining retest at the end of March 2021.  Discussed healthy dietary changes limiting carbs to 60 g per meal 15 g per snack not more than 3 times daily, cutting out sugary drinks and concentrated sweets.    Other complaints today: None.    Parts of most recent relevant visit HPI, ROS  and PE may be carried forward and all are updated as appropriate for current situation.        New complaint today:Continuous chronic bilateral hip pain, grinding. Rated at 6/10 today. Gradually worsening over time. Onset over 2 years ago. OTC NSAIDs are not helping. Diclofenac is not helping.     Past Surgical History:   Procedure Laterality Date   • APPENDECTOMY     • FOOT SURGERY     • NECK SURGERY  2012   • THUMB CARPOMETACARPAL JOINT ARTHROPLASTY FLEXOR CARPI RADIALIS TENDON     • TONSILLECTOMY        Social History     Socioeconomic History   • Marital status: Single     Spouse name: Not on file   • Number  of children: Not on file   • Years of education: Not on file   • Highest education level: Not on file   Tobacco Use   • Smoking status: Current Every Day Smoker     Packs/day: 1.00   • Smokeless tobacco: Never Used   Substance and Sexual Activity   • Alcohol use: Yes     Alcohol/week: 12.0 standard drinks     Types: 12 Cans of beer per week   • Drug use: No   • Sexual activity: Defer      The following portions of the patient's history were reviewed and updated as appropriate: He  has a past medical history of Alcohol abuse, Anxiety, Arthritis, Depression, GERD (gastroesophageal reflux disease), Headache, Low back pain, and Stomach ulcer..    Review of Systems   Constitutional: Negative.  Negative for activity change, appetite change, chills, fever and unexpected weight loss.   HENT: Negative.  Negative for ear pain, postnasal drip, rhinorrhea, sinus pressure, sneezing, sore throat, swollen glands, trouble swallowing and voice change.    Eyes: Negative.  Negative for discharge, redness, itching and visual disturbance.   Respiratory: Negative.  Negative for cough, shortness of breath and wheezing.    Cardiovascular: Negative.  Negative for chest pain and leg swelling.   Gastrointestinal: Negative.    Endocrine: Negative.  Negative for polydipsia, polyphagia and polyuria.   Genitourinary: Negative.  Negative for dysuria.   Musculoskeletal: Positive for arthralgias, back pain, neck pain and neck stiffness.   Allergic/Immunologic: Negative.    Neurological: Negative.    Hematological: Negative.    Psychiatric/Behavioral: Negative.  Negative for behavioral problems, dysphoric mood, sleep disturbance, suicidal ideas and depressed mood. The patient is not nervous/anxious.    All other systems reviewed and are negative.    PHQ-9 Depression Screening  Little interest or pleasure in doing things?     Feeling down, depressed, or hopeless?     Trouble falling or staying asleep, or sleeping too much?     Feeling tired or having  little energy?     Poor appetite or overeating?     Feeling bad about yourself - or that you are a failure or have let yourself or your family down?     Trouble concentrating on things, such as reading the newspaper or watching television?     Moving or speaking so slowly that other people could have noticed? Or the opposite - being so fidgety or restless that you have been moving around a lot more than usual?     Thoughts that you would be better off dead, or of hurting yourself in some way?     PHQ-9 Total Score     If you checked off any problems, how difficult have these problems made it for you to do your work, take care of things at home, or get along with other people?        Objective   Physical Exam  Vitals signs and nursing note reviewed.   Constitutional:       General: He is not in acute distress.     Appearance: He is obese.   Pulmonary:      Effort: Pulmonary effort is normal. No respiratory distress.      Breath sounds: No stridor.   Neurological:      Mental Status: He is alert and oriented to person, place, and time. Mental status is at baseline.   Psychiatric:         Mood and Affect: Mood normal.         Behavior: Behavior normal.         Thought Content: Thought content normal.         Judgment: Judgment normal.           Assessment/Plan   Diagnoses and all orders for this visit:    1. B12 deficiency (Primary)  -     vitamin B-12 (CYANOCOBALAMIN) 250 MCG tablet; Take 1 tablet by mouth Daily. For B12 deficiency  Dispense: 30 tablet; Refill: 5  -     Vitamin B12 & Folate; Future    2. Vitamin D deficiency  -     cholecalciferol (VITAMIN D3) 1.25 MG (14477 UT) capsule; Take 1 capsule by mouth Every 7 (Seven) Days. Low vitamin D level  Dispense: 12 capsule; Refill: 1  -     Vitamin D 25 Hydroxy; Future    3. Elevated low density lipoprotein (LDL) cholesterol level  -     atorvastatin (LIPITOR) 10 MG tablet; Take 1 tablet by mouth Daily. For cholesterol  Dispense: 30 tablet; Refill: 5  -     Lipid  Panel With LDL/HDL Ratio; Future    4. Low serum T4 level  -     T4; Future  -     TSH; Future    5. Elevated LFTs  -     Hepatic function panel; Future    6. Chronic hip pain, bilateral  -     XR Hips Bilateral With or Without Pelvis 2 View; Future    7. Primary osteoarthritis involving multiple joints  -     meloxicam (Mobic) 15 MG tablet; Take 1 tablet by mouth Daily. For arthritis pain  Dispense: 30 tablet; Refill: 0    8. Chronic midline posterior neck pain  -     meloxicam (Mobic) 15 MG tablet; Take 1 tablet by mouth Daily. For arthritis pain  Dispense: 30 tablet; Refill: 0    9. Chronic bilateral low back pain with bilateral sciatica  -     meloxicam (Mobic) 15 MG tablet; Take 1 tablet by mouth Daily. For arthritis pain  Dispense: 30 tablet; Refill: 0    10. Chronic pain of left knee  -     meloxicam (Mobic) 15 MG tablet; Take 1 tablet by mouth Daily. For arthritis pain  Dispense: 30 tablet; Refill: 0    11. Prediabetes  -     Hemoglobin A1c; Future    Other orders  -     pantoprazole (PROTONIX) 40 MG EC tablet; Take 1 tablet by mouth Daily.  Dispense: 30 tablet; Refill: 5      Return in about 1 week (around 1/5/2021).               This document has been electronically signed by IGNACIA Coleman on December 29, 2020 16:06 CST

## 2021-01-04 DIAGNOSIS — M16.0 PRIMARY OSTEOARTHRITIS OF BOTH HIPS: Primary | ICD-10-CM

## 2021-01-05 ENCOUNTER — OFFICE VISIT (OUTPATIENT)
Dept: FAMILY MEDICINE CLINIC | Facility: CLINIC | Age: 56
End: 2021-01-05

## 2021-01-05 VITALS — SYSTOLIC BLOOD PRESSURE: 124 MMHG | DIASTOLIC BLOOD PRESSURE: 62 MMHG

## 2021-01-05 DIAGNOSIS — M15.9 PRIMARY OSTEOARTHRITIS INVOLVING MULTIPLE JOINTS: ICD-10-CM

## 2021-01-05 DIAGNOSIS — R06.83 HABITUAL SNORING: ICD-10-CM

## 2021-01-05 DIAGNOSIS — G47.8 NON-RESTORATIVE SLEEP: Primary | ICD-10-CM

## 2021-01-05 PROCEDURE — 99442 PR PHYS/QHP TELEPHONE EVALUATION 11-20 MIN: CPT | Performed by: NURSE PRACTITIONER

## 2021-01-05 NOTE — PROGRESS NOTES
Subjective   Serafin Nash is a 55 y.o. male.   You have chosen to receive care through a telephone visit. Do you consent to use a telephone visit for your medical care today? Yes  20 minutes medical discussion.     No chief complaint on file.       History of Present Illness      Chronic midline lower back pain with bilateral sciatica: Gradually worsening over time.  Onset more than 1 year ago.  Plain films do not demonstrate any abnormality.  Explained that insurance will require a 6-week treatment conservatively prior to MRI.  He states understanding.  Reports diclofenac is like taking a baby aspirin does not help at all.     Chronic left knee pain, gradually worsening over time.  Onset more than 1 year ago.  Plain film also does not demonstrate any degenerative joint disease of the knee.  Declines referral to orthopedics for second opinion.     Chronic cervical neck pain, gradually worsening over time.  History of multiple cervical vertebral fusions.  Plain film demonstrates anterior cervical fusion with multilevel degenerative disease and spurring.     Overarching osteoarthritic changes of multiple joints affecting hips and neck at minimum fail improvement with treatment with diclofenac 50 mg EC tablets twice daily, Mobic is not helping either.     He is scheduled to see Dr Joya in February for chronic bilateral hip pain.     Discussed use of topical medications.     New complaints today: his girlfriend reports not only does he snore loudly but he stops breathing 6-7 times nightly. He wants a sleep study. Reports loss of restorative sleep.    No other complaints today.     Parts of most recent relevant visit HPI, ROS  and PE may be carried forward and all are updated as appropriate for current situation.         Past Surgical History:   Procedure Laterality Date   • APPENDECTOMY     • FOOT SURGERY     • NECK SURGERY  2012   • THUMB CARPOMETACARPAL JOINT ARTHROPLASTY FLEXOR CARPI RADIALIS TENDON     •  TONSILLECTOMY        Social History     Socioeconomic History   • Marital status: Single     Spouse name: Not on file   • Number of children: Not on file   • Years of education: Not on file   • Highest education level: Not on file   Tobacco Use   • Smoking status: Current Every Day Smoker     Packs/day: 1.00   • Smokeless tobacco: Never Used   Substance and Sexual Activity   • Alcohol use: Yes     Alcohol/week: 12.0 standard drinks     Types: 12 Cans of beer per week   • Drug use: No   • Sexual activity: Defer      The following portions of the patient's history were reviewed and updated as appropriate: He  has a past medical history of Alcohol abuse, Anxiety, Arthritis, Depression, GERD (gastroesophageal reflux disease), Headache, Low back pain, and Stomach ulcer..    Review of Systems   Constitutional: Negative.  Negative for activity change, appetite change, chills, fever and unexpected weight loss.   HENT: Negative.  Negative for ear pain, postnasal drip, rhinorrhea, sinus pressure, sneezing, sore throat, swollen glands, trouble swallowing and voice change.    Eyes: Negative.  Negative for discharge, redness, itching and visual disturbance.   Respiratory: Negative.  Negative for cough, shortness of breath and wheezing.    Cardiovascular: Negative.  Negative for chest pain and leg swelling.   Gastrointestinal: Negative.    Endocrine: Negative.  Negative for polydipsia, polyphagia and polyuria.   Genitourinary: Negative.  Negative for dysuria.   Musculoskeletal: Positive for arthralgias, back pain, neck pain and neck stiffness.   Allergic/Immunologic: Negative.    Neurological: Negative.    Hematological: Negative.    Psychiatric/Behavioral: Negative.  Negative for behavioral problems, dysphoric mood, sleep disturbance, suicidal ideas and depressed mood. The patient is not nervous/anxious.    All other systems reviewed and are negative.    PHQ-9 Depression Screening  Little interest or pleasure in doing things?      Feeling down, depressed, or hopeless?     Trouble falling or staying asleep, or sleeping too much?     Feeling tired or having little energy?     Poor appetite or overeating?     Feeling bad about yourself - or that you are a failure or have let yourself or your family down?     Trouble concentrating on things, such as reading the newspaper or watching television?     Moving or speaking so slowly that other people could have noticed? Or the opposite - being so fidgety or restless that you have been moving around a lot more than usual?     Thoughts that you would be better off dead, or of hurting yourself in some way?     PHQ-9 Total Score     If you checked off any problems, how difficult have these problems made it for you to do your work, take care of things at home, or get along with other people?        Objective   Physical Exam  Vitals signs and nursing note reviewed.   Constitutional:       General: He is not in acute distress.  Pulmonary:      Effort: Pulmonary effort is normal. No respiratory distress.      Breath sounds: No stridor.   Neurological:      Mental Status: He is alert and oriented to person, place, and time.   Psychiatric:         Mood and Affect: Mood normal.         Behavior: Behavior normal.         Thought Content: Thought content normal.         Judgment: Judgment normal.           Assessment/Plan   Diagnoses and all orders for this visit:    1. Non-restorative sleep (Primary)  -     Ambulatory Referral to Sleep Medicine    2. Habitual snoring  -     Ambulatory Referral to Sleep Medicine    3. Primary osteoarthritis involving multiple joints  -     Diclofenac Sodium (VOLTAREN) 1 % gel gel; Apply 4 g topically to the appropriate area as directed 2 (Two) Times a Day.  Dispense: 100 g; Refill: 5                   This document has been electronically signed by IGNACIA Coleman on January 5, 2021 14:04 CST

## 2021-02-10 ENCOUNTER — OFFICE VISIT (OUTPATIENT)
Dept: ORTHOPEDIC SURGERY | Facility: CLINIC | Age: 56
End: 2021-02-10

## 2021-02-10 VITALS — WEIGHT: 236 LBS | HEIGHT: 71 IN | BODY MASS INDEX: 33.04 KG/M2

## 2021-02-10 DIAGNOSIS — M79.604 LUMBAR PAIN WITH RADIATION DOWN BOTH LEGS: Primary | ICD-10-CM

## 2021-02-10 DIAGNOSIS — M79.605 LUMBAR PAIN WITH RADIATION DOWN BOTH LEGS: Primary | ICD-10-CM

## 2021-02-10 DIAGNOSIS — M54.16 LUMBAR RADICULOPATHY: ICD-10-CM

## 2021-02-10 DIAGNOSIS — M54.50 LUMBAR PAIN WITH RADIATION DOWN BOTH LEGS: Primary | ICD-10-CM

## 2021-02-10 DIAGNOSIS — F17.210 MODERATE CIGARETTE SMOKER (10-19 PER DAY): ICD-10-CM

## 2021-02-10 DIAGNOSIS — M25.552 BILATERAL HIP PAIN: ICD-10-CM

## 2021-02-10 DIAGNOSIS — M25.551 BILATERAL HIP PAIN: ICD-10-CM

## 2021-02-10 PROCEDURE — 99214 OFFICE O/P EST MOD 30 MIN: CPT | Performed by: ORTHOPAEDIC SURGERY

## 2021-02-10 RX ORDER — METHYLPREDNISOLONE 4 MG/1
TABLET ORAL
Qty: 21 TABLET | Refills: 0 | Status: SHIPPED | OUTPATIENT
Start: 2021-02-10 | End: 2022-04-26

## 2021-02-10 NOTE — PROGRESS NOTES
Serafin Nash is a 55 y.o. male   Primary provider:  Hollie Walden APRN       Chief Complaint   Patient presents with   • Left Hip - Pain   • Right Hip - Pain   • Initial Evaluation     Stephon Mandujanot 12/30/20       HISTORY OF PRESENT ILLNESS:    Patient states that he has been having constant pain in both hips.  He states that he has been having pain for about 1 to 1-1/2 years.  However, he states that the pain has gotten significantly worse over the last 4 to 6 weeks.  He states that he has a sharp burning pain in his buttocks.  He states that it feels like someone standing behind him and ripping his buttocks off of his pelvis.  He denies any numbness or tingling that goes down his legs.  Pain is worse with prolonged standing or sitting.  He reports that he has constant dull aches but he has occasional sharp stabbing burning pain that is worse.  He has tried anti-inflammatory medication and salves and use of a cane without improvement.  No specific injury that he recalls.  No bowel or bladder changes.    Pain  This is a chronic problem. The current episode started more than 1 year ago (19 months). The problem occurs constantly. Associated symptoms include joint swelling. Associated symptoms comments: Crushing, grinding, stabbing, aching, clicking, popping, swelling. The symptoms are aggravated by standing and walking (sitting, driving). He has tried NSAIDs (Voltaren gel) for the symptoms. The treatment provided no relief.        CONCURRENT MEDICAL HISTORY:    Past Medical History:   Diagnosis Date   • Alcohol abuse    • Anxiety    • Arthritis    • Depression    • GERD (gastroesophageal reflux disease)    • Headache    • Low back pain    • Stomach ulcer        No Known Allergies      Current Outpatient Medications:   •  atorvastatin (LIPITOR) 10 MG tablet, Take 1 tablet by mouth Daily. For cholesterol, Disp: 30 tablet, Rfl: 5  •  cholecalciferol (VITAMIN D3) 1.25 MG (04415 UT) capsule, Take 1 capsule by  mouth Every 7 (Seven) Days. Low vitamin D level, Disp: 12 capsule, Rfl: 1  •  Diclofenac Sodium (VOLTAREN) 1 % gel gel, Apply 4 g topically to the appropriate area as directed 2 (Two) Times a Day., Disp: 100 g, Rfl: 5  •  meloxicam (Mobic) 15 MG tablet, Take 1 tablet by mouth Daily. For arthritis pain, Disp: 30 tablet, Rfl: 0  •  metoprolol succinate XL (TOPROL-XL) 25 MG 24 hr tablet, Take 1 tablet by mouth Daily., Disp: 30 tablet, Rfl: 5  •  pantoprazole (PROTONIX) 40 MG EC tablet, Take 1 tablet by mouth Daily., Disp: 30 tablet, Rfl: 5  •  vitamin B-12 (CYANOCOBALAMIN) 250 MCG tablet, Take 1 tablet by mouth Daily. For B12 deficiency, Disp: 30 tablet, Rfl: 5  •  methylPREDNISolone (MEDROL) 4 MG dose pack, Use as directed by package instructions, Disp: 21 tablet, Rfl: 0    Past Surgical History:   Procedure Laterality Date   • APPENDECTOMY     • FOOT SURGERY     • NECK SURGERY  2012   • THUMB CARPOMETACARPAL JOINT ARTHROPLASTY FLEXOR CARPI RADIALIS TENDON     • TONSILLECTOMY         Family History   Problem Relation Age of Onset   • Arthritis Mother    • Asthma Mother    • Diabetes Mother    • Hypertension Mother    • Alcohol abuse Father    • Hypertension Father    • Anxiety disorder Sister    • Arthritis Sister    • Asthma Sister    • Mental illness Sister    • Alcohol abuse Brother    • Hypertension Brother    • Stroke Brother    • Stroke Maternal Grandmother        Social History     Socioeconomic History   • Marital status: Single     Spouse name: Not on file   • Number of children: Not on file   • Years of education: Not on file   • Highest education level: Not on file   Tobacco Use   • Smoking status: Current Every Day Smoker     Packs/day: 1.00   • Smokeless tobacco: Never Used   Substance and Sexual Activity   • Alcohol use: Yes     Alcohol/week: 12.0 standard drinks     Types: 12 Cans of beer per week   • Drug use: No   • Sexual activity: Defer        Review of Systems   Constitutional: Negative.    HENT:  "Negative.    Eyes: Positive for visual disturbance.   Respiratory: Negative.    Cardiovascular: Negative.    Gastrointestinal: Negative.    Endocrine: Negative.    Genitourinary: Negative.    Musculoskeletal: Positive for joint swelling.        Stiffness, muscle pain   Skin: Negative.    Allergic/Immunologic: Negative.    Neurological: Negative.    Hematological: Negative.    Psychiatric/Behavioral: Positive for sleep disturbance. The patient is nervous/anxious.         Depression       PHYSICAL EXAMINATION:       Ht 180.3 cm (71\")   Wt 107 kg (236 lb)   BMI 32.92 kg/m²     Physical Exam  Constitutional:       General: He is not in acute distress.     Appearance: Normal appearance. He is well-developed. He is not ill-appearing.   Pulmonary:      Effort: Pulmonary effort is normal. No respiratory distress.   Neurological:      Mental Status: He is alert and oriented to person, place, and time.   Psychiatric:         Mood and Affect: Mood normal.         Behavior: Behavior normal.         Thought Content: Thought content normal.         Judgment: Judgment normal.         GAIT:     []  Normal  [x]  Antalgic    Assistive device: [x]  None  []  Walker     []  Crutches  []  Cane     []  Wheelchair  []  Stretcher    Right Hip Exam     Comments:  Nontender around the hip.  He does tolerate internal and external rotation of the hip but does report pain in his buttock and doing so.  He has pain with Stinchfield test but the pain is in his buttock not in his groin.  Good distal pulses and sensation.  Stable exam.      Left Hip Exam     Comments:  Nontender around the hip.  He does tolerate internal and external rotation of the hip but does report pain in his buttock and doing so.  He has pain with Stinchfield test but the pain is in his buttock not in his groin.  Good distal pulses and sensation.  Stable exam.      Back Exam     Comments:  Limited left and right lateral bending as well as left and right lateral rotation.  He " has extreme discomfort with hyperextension and attempted hyperextension recreates the pain into his buttocks and goes down the back of his legs.  Straight leg raise from a seated position in his right leg recreates pain in his right buttock.  Straight leg raise on the left side is negative.                      Exam: AP pelvis bilateral hips two views  12/30/20     INDICATION: Hip pain     FINDINGS: AP pelvis, bilateral hips two views. No acute fracture  or dislocation. There is mild degenerative change at the hip  joints. No lytic or destructive lesion. SI joints are intact     IMPRESSION:  Mild degenerative change at hip joints. No acute bony  abnormality. Recommend follow-up as clinically warranted.     Electronically signed by:  Colt Flowers MD  12/31/2020 9:11 AM  CST Workstation: [id number]    Study Result    EXAM DESCRIPTION:      XR SPINE LUMBAR 2 OR 3 VW     CLINICAL HISTORY:      55 years  Male  known DDD lumbar spine, no previous surgery,  M54.42 Lumbago with sciatica, left side M54.41 Lumbago with  sciatica, right side G89.29 Other chronic pain     COMPARISON:      None available     TECHNIQUE:      Three views-AP, lateral, and coned radiographs of the L5-S1  level.     FINDINGS:      There is no evidence of an acute fracture. The alignment of the  lumbar spine is satisfactory. The disc spaces are well-preserved.  Pedicles are intact. The SI joints are within normal limits.     IMPRESSION:     1. Unremarkable exam of the lumbar spine without evidence of an  acute fracture or significant degenerative change.        Electronically signed by:  Ellen Kebede MD  12/16/2020 11:50 AM  CST Workstation: [id number]           ASSESSMENT:    Diagnoses and all orders for this visit:    Lumbar pain with radiation down both legs  -     MRI Lumbar Spine Without Contrast; Future    Bilateral hip pain  -     MRI Lumbar Spine Without Contrast; Future    Moderate cigarette smoker (10-19 per day)    Lumbar radiculopathy  -      MRI Lumbar Spine Without Contrast; Future    Other orders  -     methylPREDNISolone (MEDROL) 4 MG dose pack; Use as directed by package instructions          PLAN    X-rays from his pelvis and both hips as well as lumbar spine were reviewed.  No significant arthritic change appreciated on his hip x-rays.  He does show some mild SI joint arthritis change.  Lumbar spine x-rays showed good intervertebral disc heights and minimal arthritic change.    His symptoms seem to be much more related to spinal stenosis with his symptoms being recreated during hyperextension.  Obviously, there is no bony change responsible for the symptoms.  However, MRI of his lumbar spine would be warranted to assess for spinal cord compression/herniated disc/or pathologic process that might be putting pressure on lower lumbar region.    Follow-up after MRI to determine further treatment options and possible referral to neurosurgery pending the results.    We discussed use of oral steroid pack to help with symptoms acutely.    Serafin Nash  reports that he has been smoking. He has been smoking about 1.00 pack per day. He has never used smokeless tobacco.. I have educated him on the risk of diseases from using tobacco products    I advised him to quit and he is not willing to quit.    I spent 2 minutes counseling the patient.       Patient's Body mass index is 32.92 kg/m². BMI is above normal parameters. Recommendations include: exercise counseling and nutrition counseling.      Return for recheck for MRI results.    Donovan Joya MD

## 2021-02-23 ENCOUNTER — HOSPITAL ENCOUNTER (OUTPATIENT)
Dept: MRI IMAGING | Facility: HOSPITAL | Age: 56
Discharge: HOME | End: 2021-02-23
Admitting: ORTHOPAEDIC SURGERY

## 2021-02-23 DIAGNOSIS — M79.604 LUMBAR PAIN WITH RADIATION DOWN BOTH LEGS: ICD-10-CM

## 2021-02-23 DIAGNOSIS — M25.552 BILATERAL HIP PAIN: ICD-10-CM

## 2021-02-23 DIAGNOSIS — M25.551 BILATERAL HIP PAIN: ICD-10-CM

## 2021-02-23 DIAGNOSIS — M54.16 LUMBAR RADICULOPATHY: ICD-10-CM

## 2021-02-23 DIAGNOSIS — M54.50 LUMBAR PAIN WITH RADIATION DOWN BOTH LEGS: ICD-10-CM

## 2021-02-23 DIAGNOSIS — M79.605 LUMBAR PAIN WITH RADIATION DOWN BOTH LEGS: ICD-10-CM

## 2021-02-23 PROCEDURE — 72148 MRI LUMBAR SPINE W/O DYE: CPT

## 2021-03-04 DIAGNOSIS — M48.061 SPINAL STENOSIS OF LUMBAR REGION, UNSPECIFIED WHETHER NEUROGENIC CLAUDICATION PRESENT: Primary | ICD-10-CM

## 2022-04-26 ENCOUNTER — OFFICE VISIT (OUTPATIENT)
Dept: FAMILY MEDICINE CLINIC | Facility: CLINIC | Age: 57
End: 2022-04-26

## 2022-04-26 VITALS
HEIGHT: 71 IN | TEMPERATURE: 97.1 F | SYSTOLIC BLOOD PRESSURE: 140 MMHG | OXYGEN SATURATION: 99 % | DIASTOLIC BLOOD PRESSURE: 82 MMHG | RESPIRATION RATE: 16 BRPM | BODY MASS INDEX: 29.82 KG/M2 | HEART RATE: 80 BPM | WEIGHT: 213 LBS

## 2022-04-26 DIAGNOSIS — G89.29 CHRONIC MIDLINE POSTERIOR NECK PAIN: ICD-10-CM

## 2022-04-26 DIAGNOSIS — M51.36 DDD (DEGENERATIVE DISC DISEASE), LUMBAR: ICD-10-CM

## 2022-04-26 DIAGNOSIS — I10 BENIGN ESSENTIAL HTN: ICD-10-CM

## 2022-04-26 DIAGNOSIS — F41.8 MIXED ANXIETY AND DEPRESSIVE DISORDER: ICD-10-CM

## 2022-04-26 DIAGNOSIS — M54.42 CHRONIC MIDLINE LOW BACK PAIN WITH BILATERAL SCIATICA: Primary | ICD-10-CM

## 2022-04-26 DIAGNOSIS — F31.4 BIPOLAR DISORDER, CURRENT EPISODE DEPRESSED, SEVERE, WITHOUT PSYCHOTIC FEATURES: ICD-10-CM

## 2022-04-26 DIAGNOSIS — Z51.81 ENCOUNTER FOR THERAPEUTIC DRUG LEVEL MONITORING: ICD-10-CM

## 2022-04-26 DIAGNOSIS — G89.29 CHRONIC MIDLINE LOW BACK PAIN WITH BILATERAL SCIATICA: Primary | ICD-10-CM

## 2022-04-26 DIAGNOSIS — M54.41 CHRONIC MIDLINE LOW BACK PAIN WITH BILATERAL SCIATICA: Primary | ICD-10-CM

## 2022-04-26 DIAGNOSIS — M54.2 CHRONIC MIDLINE POSTERIOR NECK PAIN: ICD-10-CM

## 2022-04-26 DIAGNOSIS — G47.00 INSOMNIA, UNSPECIFIED TYPE: ICD-10-CM

## 2022-04-26 DIAGNOSIS — Z98.1 HISTORY OF FUSION OF CERVICAL SPINE: ICD-10-CM

## 2022-04-26 DIAGNOSIS — M48.061 NEURAL FORAMINAL STENOSIS OF LUMBAR SPINE: ICD-10-CM

## 2022-04-26 PROCEDURE — 99214 OFFICE O/P EST MOD 30 MIN: CPT | Performed by: NURSE PRACTITIONER

## 2022-04-26 RX ORDER — DULOXETIN HYDROCHLORIDE 30 MG/1
30 CAPSULE, DELAYED RELEASE ORAL 2 TIMES DAILY
Qty: 60 CAPSULE | Refills: 0 | Status: SHIPPED | OUTPATIENT
Start: 2022-04-26 | End: 2022-05-06 | Stop reason: SDUPTHER

## 2022-04-26 RX ORDER — METOPROLOL SUCCINATE 25 MG/1
25 TABLET, EXTENDED RELEASE ORAL DAILY
Qty: 30 TABLET | Refills: 5 | Status: SHIPPED | OUTPATIENT
Start: 2022-04-26 | End: 2022-05-24 | Stop reason: SDUPTHER

## 2022-04-26 RX ORDER — HYDROCODONE BITARTRATE AND ACETAMINOPHEN 10; 325 MG/1; MG/1
1 TABLET ORAL EVERY 4 HOURS PRN
Qty: 30 TABLET | Refills: 0 | Status: SHIPPED | OUTPATIENT
Start: 2022-04-26 | End: 2022-05-06 | Stop reason: DRUGHIGH

## 2022-04-26 RX ORDER — QUETIAPINE FUMARATE 400 MG/1
400 TABLET, FILM COATED ORAL NIGHTLY
Qty: 30 TABLET | Refills: 0 | Status: SHIPPED | OUTPATIENT
Start: 2022-04-26 | End: 2022-05-24 | Stop reason: ALTCHOICE

## 2022-04-26 RX ORDER — GABAPENTIN 600 MG/1
600 TABLET ORAL 3 TIMES DAILY
Qty: 90 TABLET | Refills: 0 | Status: SHIPPED | OUTPATIENT
Start: 2022-04-26 | End: 2022-05-16 | Stop reason: SDDI

## 2022-04-26 NOTE — PROGRESS NOTES
Subjective   Serafin Nash is a 57 y.o. male.     has Trigger ring finger of right hand on their problem list.     Chief Complaint   Patient presents with   • Back Pain     Also wants a referral for psych         History of Present Illness   Reports an episode last month during which, without any stimulants or illicit drugs, he was awake for better part of 3-4 days and was in constant motion. He finally had to smoke marijuana to get to sleep. Reports behavior as a child he feels is consistent with manic episodes. He is depressed with so much back pain due to known OA/ DDD lumbar spine and known neural foraminal stenosis with some impingement upon bilateral exiting nerve roots in lower lumbar spine, that he cant cheer up and has had thoughts of being better off dead to end his pain. He did not make a plan for suicidal gesture and does not want to die, but reports has never been this down and depressed before in his life. Wants a referral to psych. Has not been on antidepressants before. Some difficulty with anxiety, but less so than depression currently.     Sleep: reports persistent trouble falling asleep with average sleep latency of 1 hour, and sleeps about 4 hours and sometimes goes for days without sleep without stimulant use. No caffiene use usually, reports 2 cups of coffee in past 3 weeks. No sodas and no tea. No energy drinks.  He has used meth before but not often, maybe twice per month. Long discussion about abstinence of this or no one will risk prescribing pain medication he needs. Has smoked marijuana a little more often than that, but not daily.    Also complains of bilateral hand pain, with limited ROM, but advised will need to address this at another visit.     HTN elevated mild to moderately, has been out of his Toprol due to financial concerns. Refill sent. No chest pain or dizziness currently.     No other complaints today.     Parts of most recent relevant visit HPI, ROS  and PE may be  carried forward and all are updated as appropriate for current situation.    Past Surgical History:   Procedure Laterality Date   • APPENDECTOMY     • FOOT SURGERY     • NECK SURGERY  2012   • THUMB CARPOMETACARPAL JOINT ARTHROPLASTY FLEXOR CARPI RADIALIS TENDON     • TONSILLECTOMY        Social History     Socioeconomic History   • Marital status:    Tobacco Use   • Smoking status: Current Every Day Smoker     Packs/day: 1.00   • Smokeless tobacco: Never Used   Substance and Sexual Activity   • Alcohol use: Yes     Alcohol/week: 12.0 standard drinks     Types: 12 Cans of beer per week   • Drug use: No   • Sexual activity: Defer      The following portions of the patient's history were reviewed and updated as appropriate: allergies, current medications, past family history, past medical history, past social history, past surgical history and problem list.      Review of Systems   Constitutional: Negative.  Negative for activity change, appetite change, chills, fever and unexpected weight loss.   HENT: Negative.  Negative for ear pain, postnasal drip, rhinorrhea, sinus pressure, sneezing, sore throat, swollen glands, trouble swallowing and voice change.    Eyes: Negative.  Negative for discharge, redness, itching and visual disturbance.   Respiratory: Negative for cough, shortness of breath and wheezing.    Cardiovascular: Negative.  Negative for chest pain and leg swelling.   Gastrointestinal: Negative.    Endocrine: Negative.  Negative for polydipsia, polyphagia and polyuria.   Genitourinary: Negative.  Negative for dysuria.   Musculoskeletal: Positive for arthralgias and back pain.   Allergic/Immunologic: Negative.    Neurological: Negative.    Hematological: Negative.    Psychiatric/Behavioral: Positive for sleep disturbance, suicidal ideas and depressed mood. Negative for behavioral problems and dysphoric mood. The patient is nervous/anxious.    All other systems reviewed and are negative.    PHQ-9  Depression Screening  Little interest or pleasure in doing things?     Feeling down, depressed, or hopeless?     Trouble falling or staying asleep, or sleeping too much?     Feeling tired or having little energy?     Poor appetite or overeating?     Feeling bad about yourself - or that you are a failure or have let yourself or your family down?     Trouble concentrating on things, such as reading the newspaper or watching television?     Moving or speaking so slowly that other people could have noticed? Or the opposite - being so fidgety or restless that you have been moving around a lot more than usual?     Thoughts that you would be better off dead, or of hurting yourself in some way?     PHQ-9 Total Score     If you checked off any problems, how difficult have these problems made it for you to do your work, take care of things at home, or get along with other people?      Patient understands the risks associated with this controlled medication, including tolerance and addiction.  Patient also agrees to only obtain this medication from me, and not from a another provider, unless that provider is covering for me in my absence.  Patient also agrees to be compliant in dosing, and not self adjust the dose of medication.  A signed controlled substance agreement is on file, and the patient has received a controlled substance education sheet at this a previous visit.  The patient has also signed a consent for treatment with a controlled substance as per [facility] policy. JOE was obtained.    Objective   Physical Exam  Vitals and nursing note reviewed.   Constitutional:       General: He is not in acute distress.     Appearance: Normal appearance. He is well-developed. He is not ill-appearing or diaphoretic.      Comments: overweight   HENT:      Head: Normocephalic and atraumatic.   Eyes:      General: No scleral icterus.        Right eye: No discharge.         Left eye: No discharge.      Conjunctiva/sclera:  "Conjunctivae normal.      Pupils: Pupils are equal, round, and reactive to light.   Neck:      Thyroid: No thyromegaly.      Vascular: No carotid bruit or JVD.      Trachea: No tracheal deviation.   Cardiovascular:      Rate and Rhythm: Normal rate and regular rhythm.      Pulses: Normal pulses.      Heart sounds: Normal heart sounds. No murmur heard.    No friction rub. No gallop.   Pulmonary:      Effort: Pulmonary effort is normal. No respiratory distress.      Breath sounds: Normal breath sounds. No stridor. No wheezing, rhonchi or rales.   Chest:      Chest wall: No tenderness.   Abdominal:      General: Bowel sounds are normal.      Palpations: Abdomen is soft.   Musculoskeletal:         General: No tenderness or deformity. Normal range of motion.      Cervical back: Normal range of motion and neck supple. No rigidity or tenderness.      Right lower leg: No edema.      Left lower leg: No edema.   Lymphadenopathy:      Cervical: No cervical adenopathy.   Skin:     General: Skin is warm and dry.      Capillary Refill: Capillary refill takes 2 to 3 seconds.      Coloration: Skin is not jaundiced or pale.      Findings: No bruising, erythema, lesion or rash.   Neurological:      General: No focal deficit present.      Mental Status: He is alert and oriented to person, place, and time. Mental status is at baseline.      Motor: No weakness.      Gait: Gait normal.   Psychiatric:         Mood and Affect: Mood normal.         Behavior: Behavior normal.         Thought Content: Thought content normal.         Judgment: Judgment normal.       Vitals:    04/26/22 0939   BP: 140/82   Pulse: 80   Resp: 16   Temp: 97.1 °F (36.2 °C)   SpO2: 99%   Weight: 96.6 kg (213 lb)   Height: 180.3 cm (71\")   PainSc:   8   PainLoc: Back      Body mass index is 29.71 kg/m².  Contract for hydrocodone 1 week supply, 1month supply gabapentin for back and neck pain, sciatica. Referral to neurosurgery, Dr Breen after xray/ MRI " result.  Duloxetine and seroquel for anxiety, depression, insomnia and bipolar disorder.   Follow up a week from Friday;.refill Toprol.     Assessment/Plan   Diagnoses and all orders for this visit:    1. Chronic midline low back pain with bilateral sciatica (Primary)  -     HYDROcodone-acetaminophen (NORCO)  MG per tablet; Take 1 tablet by mouth Every 4 (Four) Hours As Needed for Moderate Pain . For chronic midline lowback pain w/ chandana sciatica, chronic neck pain. M54.41, M54.2,G89.29  Dispense: 30 tablet; Refill: 0  -     gabapentin (NEURONTIN) 600 MG tablet; Take 1 tablet by mouth 3 (Three) Times a Day. For chronic midline lowback pain w/ chandana sciatica, chronic neck pain. M54.41, M54.2,G89.29  Dispense: 90 tablet; Refill: 0  -     XR Spine Lumbar 2 or 3 View  -     MRI Lumbar Spine With & Without Contrast; Future  -     DULoxetine (Cymbalta) 30 MG capsule; Take 1 capsule by mouth 2 (Two) Times a Day. For depression, anxiety and back pain.  Dispense: 60 capsule; Refill: 0    2. Encounter for therapeutic drug level monitoring  -     ToxASSURE Select 13 Discrete -  -     Gabapentin, Urine -    3. Chronic midline posterior neck pain  -     HYDROcodone-acetaminophen (NORCO)  MG per tablet; Take 1 tablet by mouth Every 4 (Four) Hours As Needed for Moderate Pain . For chronic midline lowback pain w/ chandana sciatica, chronic neck pain. M54.41, M54.2,G89.29  Dispense: 30 tablet; Refill: 0  -     gabapentin (NEURONTIN) 600 MG tablet; Take 1 tablet by mouth 3 (Three) Times a Day. For chronic midline lowback pain w/ chandana sciatica, chronic neck pain. M54.41, M54.2,G89.29  Dispense: 90 tablet; Refill: 0  -     XR Spine Cervical 2 or 3 View    4. History of fusion of cervical spine  -     XR Spine Cervical 2 or 3 View    5. DDD (degenerative disc disease), lumbar  -     MRI Lumbar Spine With & Without Contrast; Future    6. Neural foraminal stenosis of lumbar spine  -     MRI Lumbar Spine With & Without Contrast;  Future    7. Mixed anxiety and depressive disorder  -     DULoxetine (Cymbalta) 30 MG capsule; Take 1 capsule by mouth 2 (Two) Times a Day. For depression, anxiety and back pain.  Dispense: 60 capsule; Refill: 0  -     QUEtiapine (SEROquel) 400 MG tablet; Take 1 tablet by mouth Every Night. USE GOOD RX COUPON if insurance will not cover for less $$. For mood, bipolar and anxiety  Dispense: 30 tablet; Refill: 0    8. Insomnia, unspecified type  -     QUEtiapine (SEROquel) 400 MG tablet; Take 1 tablet by mouth Every Night. USE GOOD RX COUPON if insurance will not cover for less $$. For mood, bipolar and anxiety  Dispense: 30 tablet; Refill: 0    9. Bipolar disorder, current episode depressed, severe, without psychotic features (HCC)  -     QUEtiapine (SEROquel) 400 MG tablet; Take 1 tablet by mouth Every Night. USE GOOD RX COUPON if insurance will not cover for less $$. For mood, bipolar and anxiety  Dispense: 30 tablet; Refill: 0    10. Benign essential HTN  -     metoprolol succinate XL (TOPROL-XL) 25 MG 24 hr tablet; Take 1 tablet by mouth Daily.  Dispense: 30 tablet; Refill: 5               Return in about 10 days (around 5/6/2022) for have lab done Wednesday next week.  There are no Patient Instructions on file for this visit.        This document has been electronically signed by IGNACIA Coleman on April 26, 2022 11:13 CDT

## 2022-04-28 ENCOUNTER — PRIOR AUTHORIZATION (OUTPATIENT)
Dept: FAMILY MEDICINE CLINIC | Facility: CLINIC | Age: 57
End: 2022-04-28

## 2022-04-28 NOTE — TELEPHONE ENCOUNTER
4/27/2022  Serafin Nash (Garcia: [id number]) - [id number]  QUEtiapine Fumarate 400MG tablets    Status: PA Response - Approved

## 2022-05-03 DIAGNOSIS — M51.36 DDD (DEGENERATIVE DISC DISEASE), LUMBAR: ICD-10-CM

## 2022-05-03 DIAGNOSIS — M54.41 CHRONIC MIDLINE LOW BACK PAIN WITH BILATERAL SCIATICA: Primary | ICD-10-CM

## 2022-05-03 DIAGNOSIS — G89.29 CHRONIC MIDLINE LOW BACK PAIN WITH BILATERAL SCIATICA: Primary | ICD-10-CM

## 2022-05-03 DIAGNOSIS — M54.42 CHRONIC MIDLINE LOW BACK PAIN WITH BILATERAL SCIATICA: Primary | ICD-10-CM

## 2022-05-03 DIAGNOSIS — M48.061 NEURAL FORAMINAL STENOSIS OF LUMBAR SPINE: ICD-10-CM

## 2022-05-04 ENCOUNTER — LAB (OUTPATIENT)
Dept: LAB | Facility: OTHER | Age: 57
End: 2022-05-04

## 2022-05-04 PROCEDURE — G0481 DRUG TEST DEF 8-14 CLASSES: HCPCS | Performed by: NURSE PRACTITIONER

## 2022-05-04 PROCEDURE — 80307 DRUG TEST PRSMV CHEM ANLYZR: CPT | Performed by: NURSE PRACTITIONER

## 2022-05-06 ENCOUNTER — OFFICE VISIT (OUTPATIENT)
Dept: FAMILY MEDICINE CLINIC | Facility: CLINIC | Age: 57
End: 2022-05-06

## 2022-05-06 VITALS
OXYGEN SATURATION: 99 % | RESPIRATION RATE: 16 BRPM | SYSTOLIC BLOOD PRESSURE: 142 MMHG | HEART RATE: 89 BPM | BODY MASS INDEX: 31.18 KG/M2 | TEMPERATURE: 97.8 F | DIASTOLIC BLOOD PRESSURE: 78 MMHG | HEIGHT: 71 IN | WEIGHT: 222.7 LBS

## 2022-05-06 DIAGNOSIS — M54.42 CHRONIC MIDLINE LOW BACK PAIN WITH BILATERAL SCIATICA: Primary | Chronic | ICD-10-CM

## 2022-05-06 DIAGNOSIS — F41.8 MIXED ANXIETY AND DEPRESSIVE DISORDER: Chronic | ICD-10-CM

## 2022-05-06 DIAGNOSIS — M48.061 NEURAL FORAMINAL STENOSIS OF LUMBAR SPINE: Chronic | ICD-10-CM

## 2022-05-06 DIAGNOSIS — D45 POLYCYTHEMIA VERA: ICD-10-CM

## 2022-05-06 DIAGNOSIS — F31.4 BIPOLAR DISORDER, CURRENT EPISODE DEPRESSED, SEVERE, WITHOUT PSYCHOTIC FEATURES: Chronic | ICD-10-CM

## 2022-05-06 DIAGNOSIS — G89.29 CHRONIC MIDLINE LOW BACK PAIN WITH BILATERAL SCIATICA: Primary | Chronic | ICD-10-CM

## 2022-05-06 DIAGNOSIS — M54.41 CHRONIC MIDLINE LOW BACK PAIN WITH BILATERAL SCIATICA: Primary | Chronic | ICD-10-CM

## 2022-05-06 DIAGNOSIS — G47.00 INSOMNIA, UNSPECIFIED TYPE: Chronic | ICD-10-CM

## 2022-05-06 DIAGNOSIS — I10 BENIGN ESSENTIAL HTN: Chronic | ICD-10-CM

## 2022-05-06 PROBLEM — M15.9 PRIMARY OSTEOARTHRITIS INVOLVING MULTIPLE JOINTS: Status: ACTIVE | Noted: 2022-05-06

## 2022-05-06 PROBLEM — M54.2 CHRONIC MIDLINE POSTERIOR NECK PAIN: Status: ACTIVE | Noted: 2022-05-06

## 2022-05-06 PROBLEM — R06.83 HABITUAL SNORING: Chronic | Status: ACTIVE | Noted: 2022-05-06

## 2022-05-06 PROBLEM — G47.8 NON-RESTORATIVE SLEEP: Status: ACTIVE | Noted: 2022-05-06

## 2022-05-06 PROBLEM — M25.552 CHRONIC HIP PAIN, BILATERAL: Status: ACTIVE | Noted: 2022-05-06

## 2022-05-06 PROBLEM — R73.03 PREDIABETES: Status: ACTIVE | Noted: 2022-05-06

## 2022-05-06 PROBLEM — F31.9 AFFECTIVE PSYCHOSIS, BIPOLAR: Status: ACTIVE | Noted: 2022-05-06

## 2022-05-06 PROBLEM — F17.210 CIGARETTE NICOTINE DEPENDENCE WITHOUT COMPLICATION: Status: ACTIVE | Noted: 2022-05-06

## 2022-05-06 PROBLEM — R79.89 ELEVATED LFTS: Status: ACTIVE | Noted: 2022-05-06

## 2022-05-06 PROBLEM — E55.9 VITAMIN D DEFICIENCY: Chronic | Status: ACTIVE | Noted: 2022-05-06

## 2022-05-06 PROBLEM — M16.0 PRIMARY OSTEOARTHRITIS OF BOTH HIPS: Chronic | Status: ACTIVE | Noted: 2022-05-06

## 2022-05-06 PROBLEM — M25.552 CHRONIC HIP PAIN, BILATERAL: Chronic | Status: ACTIVE | Noted: 2022-05-06

## 2022-05-06 PROBLEM — R79.89 ELEVATED LFTS: Chronic | Status: ACTIVE | Noted: 2022-05-06

## 2022-05-06 PROBLEM — M15.9 PRIMARY OSTEOARTHRITIS INVOLVING MULTIPLE JOINTS: Chronic | Status: ACTIVE | Noted: 2022-05-06

## 2022-05-06 PROBLEM — E53.8 B12 DEFICIENCY: Chronic | Status: ACTIVE | Noted: 2022-05-06

## 2022-05-06 PROBLEM — M25.562 CHRONIC PAIN OF LEFT KNEE: Chronic | Status: ACTIVE | Noted: 2022-05-06

## 2022-05-06 PROBLEM — M54.2 CHRONIC MIDLINE POSTERIOR NECK PAIN: Chronic | Status: ACTIVE | Noted: 2022-05-06

## 2022-05-06 PROBLEM — E78.00 ELEVATED LOW DENSITY LIPOPROTEIN (LDL) CHOLESTEROL LEVEL: Chronic | Status: ACTIVE | Noted: 2022-05-06

## 2022-05-06 PROBLEM — R06.83 HABITUAL SNORING: Status: ACTIVE | Noted: 2022-05-06

## 2022-05-06 PROBLEM — E55.9 VITAMIN D DEFICIENCY: Status: ACTIVE | Noted: 2022-05-06

## 2022-05-06 PROBLEM — M16.0 PRIMARY OSTEOARTHRITIS OF BOTH HIPS: Status: ACTIVE | Noted: 2022-05-06

## 2022-05-06 PROBLEM — E53.8 B12 DEFICIENCY: Status: ACTIVE | Noted: 2022-05-06

## 2022-05-06 PROBLEM — F17.210 CIGARETTE NICOTINE DEPENDENCE WITHOUT COMPLICATION: Chronic | Status: ACTIVE | Noted: 2022-05-06

## 2022-05-06 PROBLEM — M25.551 CHRONIC HIP PAIN, BILATERAL: Status: ACTIVE | Noted: 2022-05-06

## 2022-05-06 PROBLEM — F31.9 AFFECTIVE PSYCHOSIS, BIPOLAR: Chronic | Status: ACTIVE | Noted: 2022-05-06

## 2022-05-06 PROBLEM — M25.551 CHRONIC HIP PAIN, BILATERAL: Chronic | Status: ACTIVE | Noted: 2022-05-06

## 2022-05-06 PROBLEM — E78.00 ELEVATED LOW DENSITY LIPOPROTEIN (LDL) CHOLESTEROL LEVEL: Status: ACTIVE | Noted: 2022-05-06

## 2022-05-06 PROBLEM — M25.562 CHRONIC PAIN OF LEFT KNEE: Status: ACTIVE | Noted: 2022-05-06

## 2022-05-06 PROBLEM — R73.03 PREDIABETES: Chronic | Status: ACTIVE | Noted: 2022-05-06

## 2022-05-06 PROBLEM — M65.341 TRIGGER RING FINGER OF RIGHT HAND: Chronic | Status: ACTIVE | Noted: 2019-05-08

## 2022-05-06 PROBLEM — G47.8 NON-RESTORATIVE SLEEP: Chronic | Status: ACTIVE | Noted: 2022-05-06

## 2022-05-06 LAB — GABAPENTIN UR-MCNC: >800 UG/ML

## 2022-05-06 PROCEDURE — 99214 OFFICE O/P EST MOD 30 MIN: CPT | Performed by: NURSE PRACTITIONER

## 2022-05-06 RX ORDER — DULOXETIN HYDROCHLORIDE 30 MG/1
30 CAPSULE, DELAYED RELEASE ORAL 2 TIMES DAILY
Qty: 60 CAPSULE | Refills: 5 | Status: SHIPPED | OUTPATIENT
Start: 2022-05-06 | End: 2022-05-24 | Stop reason: SDUPTHER

## 2022-05-06 RX ORDER — ASPIRIN 81 MG/1
81 TABLET ORAL DAILY
Qty: 90 TABLET | Refills: 3 | Status: SHIPPED | OUTPATIENT
Start: 2022-05-06 | End: 2022-05-24 | Stop reason: SDUPTHER

## 2022-05-06 NOTE — PROGRESS NOTES
Subjective   Serafin Nash is a 57 y.o. male.     has Trigger ring finger of right hand; Chronic midline low back pain with bilateral sciatica; Neural foraminal stenosis of lumbar spine; Mixed anxiety and depressive disorder; Chronic midline posterior neck pain; Affective psychosis, bipolar (HCC); Non-restorative sleep; Primary osteoarthritis involving multiple joints; Vitamin D deficiency; Elevated low density lipoprotein (LDL) cholesterol level; Elevated LFTs; Prediabetes; Insomnia; Benign essential HTN; Habitual snoring; Primary osteoarthritis of both hips; B12 deficiency; Chronic hip pain, bilateral; Chronic pain of left knee; Cigarette nicotine dependence without complication; and Polycythemia vera (HCC) on their problem list.     Chief Complaint   Patient presents with   • Back Pain     Follow       Depression is not as constant with addition of cymbalta. Is intermittent and not as severe, by his statement today. Previously was more pervasive and constant. Still has not been able to get seroquel which he will  today.  Occasional thoughts he would be better off dead are less often and are not suicide plans. Feels 30% improvement in depression since last visit.     Chronic lumbar pain with bilateral sciatica reports with hydrocodone is not  Better during the day but it does help him sleep without interruption of pain. Has been only taking at bedtime. Doesn't need a refill was given rx for #30 and it doesn't do much good during the day so he has plenty left. Walks with a cane in right hand to prevent falling. Balance is off due to posture required for walking with his back pain.   Reports cannot tell any improvement in his sciatica with gabapentin but reports sleeps better with it that without and thinks it has some positive effects on his mood.  He is scheduled for lumbar MRI on 5/19/22 at 9:30 am at [facility]. Most recent previous MRI done in March 2021 shows moderate to severe central and neural  foraminal stenosis at multiple levels and was recommended by Dr Joya at that time to see neurosurgery but was lost to follow up. Wants to see Dr Breen after updated MRI complete don 5/19/22. Aware that pain meds have done all they can for him and ready for intervention.         Depression  Visit Type: follow-up  Patient presents with the following symptoms: depressed mood, nervousness/anxiety and suicidal ideas (less often).  Patient is not experiencing: shortness of breath and weight loss.  Frequency of symptoms: occasionally   Severity: mild   Sleep quality: fair  Nighttime awakenings: one to two  Compliance with medications:  %        Back Pain  This is a chronic problem. The current episode started more than 1 year ago. The problem occurs constantly. The problem has been gradually worsening since onset. The pain is present in the lumbar spine. The quality of the pain is described as aching, burning, cramping, shooting and stabbing. The pain radiates to the right thigh and left thigh. The pain is at a severity of 7/10. The pain is moderate. The pain is the same all the time. The symptoms are aggravated by bending, lying down, sitting, standing and twisting. Stiffness is present all day. Pertinent negatives include no chest pain, dysuria, fever or weight loss. Risk factors include obesity and lack of exercise. He has tried analgesics, bed rest, home exercises, heat, ice, muscle relaxant, NSAIDs and walking for the symptoms. The treatment provided no relief.      Past Surgical History:   Procedure Laterality Date   • APPENDECTOMY     • FOOT SURGERY     • NECK SURGERY  2012   • THUMB CARPOMETACARPAL JOINT ARTHROPLASTY FLEXOR CARPI RADIALIS TENDON     • TONSILLECTOMY        Social History     Socioeconomic History   • Marital status:    Tobacco Use   • Smoking status: Current Every Day Smoker     Packs/day: 1.00   • Smokeless tobacco: Never Used   Substance and Sexual Activity   • Alcohol use: Yes      Alcohol/week: 12.0 standard drinks     Types: 12 Cans of beer per week   • Drug use: No   • Sexual activity: Defer      The following portions of the patient's history were reviewed and updated as appropriate: allergies, current medications, past family history, past medical history, past social history, past surgical history and problem list.    Review of Systems   Constitutional: Negative.  Negative for activity change, appetite change, chills, fever and unexpected weight loss.   HENT: Negative.  Negative for ear pain, postnasal drip, rhinorrhea, sinus pressure, sneezing, sore throat, swollen glands, trouble swallowing and voice change.    Eyes: Negative.  Negative for discharge, redness, itching and visual disturbance.   Respiratory: Negative for cough, shortness of breath and wheezing.    Cardiovascular: Negative.  Negative for chest pain and leg swelling.   Gastrointestinal: Negative.    Endocrine: Negative.  Negative for polydipsia, polyphagia and polyuria.   Genitourinary: Negative.  Negative for dysuria.   Musculoskeletal: Positive for arthralgias and back pain.   Allergic/Immunologic: Negative.    Neurological: Negative.    Hematological: Negative.    Psychiatric/Behavioral: Positive for sleep disturbance, suicidal ideas (less often) and depressed mood. Negative for behavioral problems and dysphoric mood. The patient is nervous/anxious.    All other systems reviewed and are negative.    PHQ-9 Depression Screening  Little interest or pleasure in doing things?     Feeling down, depressed, or hopeless?     Trouble falling or staying asleep, or sleeping too much?     Feeling tired or having little energy?     Poor appetite or overeating?     Feeling bad about yourself - or that you are a failure or have let yourself or your family down?     Trouble concentrating on things, such as reading the newspaper or watching television?     Moving or speaking so slowly that other people could have noticed? Or the opposite  - being so fidgety or restless that you have been moving around a lot more than usual?     Thoughts that you would be better off dead, or of hurting yourself in some way?     PHQ-9 Total Score     If you checked off any problems, how difficult have these problems made it for you to do your work, take care of things at home, or get along with other people?      Patient understands the risks associated with this controlled medication, including tolerance and addiction.  Patient also agrees to only obtain this medication from me, and not from a another provider, unless that provider is covering for me in my absence.  Patient also agrees to be compliant in dosing, and not self adjust the dose of medication.  A signed controlled substance agreement is on file, and the patient has received a controlled substance education sheet at this a previous visit.  The patient has also signed a consent for treatment with a controlled substance as per [facility] policy. JOE was obtained.    Objective   Physical Exam  Vitals and nursing note reviewed.   Constitutional:       General: He is not in acute distress.     Appearance: Normal appearance. He is well-developed. He is not ill-appearing or diaphoretic.      Comments: overweight   HENT:      Head: Normocephalic and atraumatic.   Eyes:      General: No scleral icterus.        Right eye: No discharge.         Left eye: No discharge.      Conjunctiva/sclera: Conjunctivae normal.      Pupils: Pupils are equal, round, and reactive to light.   Neck:      Thyroid: No thyromegaly.      Vascular: No carotid bruit or JVD.      Trachea: No tracheal deviation.   Cardiovascular:      Rate and Rhythm: Normal rate and regular rhythm.      Pulses: Normal pulses.      Heart sounds: Normal heart sounds. No murmur heard.    No friction rub. No gallop.   Pulmonary:      Effort: Pulmonary effort is normal. No respiratory distress.      Breath sounds: Normal breath sounds. No stridor. No  wheezing, rhonchi or rales.   Chest:      Chest wall: No tenderness.   Abdominal:      General: Bowel sounds are normal.      Palpations: Abdomen is soft.   Musculoskeletal:         General: No tenderness or deformity. Normal range of motion.      Cervical back: Normal range of motion and neck supple. No rigidity or tenderness.      Right lower leg: No edema.      Left lower leg: No edema.   Lymphadenopathy:      Cervical: No cervical adenopathy.   Skin:     General: Skin is warm and dry.      Capillary Refill: Capillary refill takes 2 to 3 seconds.      Coloration: Skin is not jaundiced or pale.      Findings: No bruising, erythema, lesion or rash.   Neurological:      General: No focal deficit present.      Mental Status: He is alert and oriented to person, place, and time. Mental status is at baseline.      Motor: No weakness.      Gait: Gait normal.   Psychiatric:         Mood and Affect: Mood normal.         Behavior: Behavior normal.         Thought Content: Thought content normal.         Judgment: Judgment normal.     Study Result    Narrative & Impression      Three view lumbar spine     HISTORY: Degenerative disc disease with neural foraminal  narrowing. Lumbago with bilateral sciatica. Chronic pain.     AP and lateral radiographs of the lumbar spine and spot film of  the lumbosacral junction were obtained.     COMPARISON: December 15, 2020.     FINDINGS:   Straightening of the lumbar lordosis.   Congenitally short pedicles.  Minimal multilevel small marginal osteophytes.  Facet arthropathy L2-3 with stable mild anterolisthesis of L2 on  L3.  Minimal degenerative changes lower thoracic spine.  Vertebral height and alignment are maintained.  No fracture.  The pedicles are intact.     IMPRESSION:  CONCLUSION:  Straightening of the lumbar lordosis.   Congenitally short pedicles.  Multilevel small marginal osteophytes.  Facet arthropathy L2-3 with stable mild anterolisthesis of L2 on  L3.  Minimal degenerative  changes lower thoracic spine.     [id number]     Electronically signed by:  Joey Kim MD  4/27/2022 5:06 PM CDT  Workstation: [id number]     Study Result    Narrative & Impression   PROCEDURE: X-ray three view cervical spine  COMPARISON: 12/15/2020     HISTORY: chronic cervical neck pain worsening, hx C spine  surgery, M54.2 Cervicalgia G89.29 Other chronic pain Z98.1  Arthrodesis status.     FINDINGS:   AP, lateral and open mouth odontoid views are obtained of the  cervical spine.   The odontoid process is intact. The lateral masses of C1  demonstrate normal anatomic alignment and symmetry about the  dens.   Status post anterior discectomy and fusion of C5-C6.  Disc space narrowing at C6-C7.  Large anterior osteophytes at C4-C5.  No radiographic evidence of acute fracture.   The prevertebral soft tissues are within normal limits.     IMPRESSION:  Postoperative and degenerative changes     Electronically signed by:  Roderick Diallo MD  4/27/2022 3:07 PM CDT  Workstation: [id number]   Study Result    Narrative & Impression   PROCEDURE: MR LUMBAR SPINE WITHOUT IV CONTRAST     Clinical History: pain, M25.551 Pain in right hip M25.552 Pain in  left hip M54.5 Low back pain M54.16 Radiculopathy, lumbar region     Indications: Same as above     Comparison: X-ray of the lumbar spine done on 12/15/2020     Technique: Noncontrast MRI of the lumbar spine was done in a  multiplanar and multi-sequence format.     Findings:      The thoracolumbar junction appears unremarkable.     There is presence of six nonrib-bearing lumbar vertebrae. There  is sacralization of the L6 vertebra     The conus medullaris terminates at L1 level and appears  unremarkable.     There is no loss of vertebral body height.      There is no evidence of bone marrow edema.     There is no evidence of spondylolisthesis. The paravertebral soft  tissues are unremarkable     The L1/L2 and L2/L3 levels are unremarkable.  At L3/L4 level hypertrophic changes cause  "moderate amount of  spinal canal stenosis without any significant disc bulge or  protrusion or neural foraminal compromise.  At L4/L5 level hypertrophic changes cause severe spinal canal  stenosis. There is mild annular posterior disc bulge at this  level with minimal compromise of the right-sided neural foramina  due to right neural recess disc bulge seen at this level.  At L5/6 level there is severe spinal canal stenosis due to  hypertrophic change and there is presence of mild annular  posterior disc bulge with minimal compromise of the bilateral  neural foramina.  The L6/S1 level does not show any disc bulge or disc protrusion,  neural foraminal compromise or spinal canal stenosis.     IMPRESSION:  Impression:     There is presence of six nonrib-bearing lumbar vertebrae, with  sacralization of the L6 vertebra noted   At L3/L4 level hypertrophic changes cause moderate amount of  spinal canal stenosis without any significant disc bulge or  protrusion or neural foraminal compromise.  At L4/L5 level hypertrophic changes cause severe spinal canal  stenosis. There is mild annular posterior disc bulge at this  level with minimal compromise of the right-sided neural foramina  due to right neural recess disc bulge seen at this level.  At L5/6 level there is severe spinal canal stenosis due to  hypertrophic change and there is presence of mild annular  posterior disc bulge with minimal compromise of the bilateral  neural foramina.  The L6/S1 level does not show any disc bulge or disc protrusion,  neural foraminal compromise or spinal canal stenosis.     Electronically signed by:  Rory Andrade MD  2/23/2021 5:54 PM [facility]  Workstation: [email or web address]         Vitals:    05/06/22 1430   BP: 142/78   BP Location: Left arm   Patient Position: Sitting   Cuff Size: Adult   Pulse: 89   Resp: 16   Temp: 97.8 °F (36.6 °C)   SpO2: 99%   Weight: 101 kg (222 lb 11.2 oz)   Height: 180.3 cm (71\")   PainSc:   7   PainLoc: Back      Body " mass index is 31.06 kg/m².      Assessment/Plan   Diagnoses and all orders for this visit:    1. Chronic midline low back pain with bilateral sciatica (Primary)  Comments:  hydrocodone helps him sleep but not much relief during the day. will proceed with updated MRI on 5/19 as planned and referr to Dr Breen, neurosurgery.  Orders:  -     DULoxetine (Cymbalta) 30 MG capsule; Take 1 capsule by mouth 2 (Two) Times a Day. For depression, anxiety and back pain.  Dispense: 60 capsule; Refill: 5    2. Neural foraminal stenosis of lumbar spine  Comments:  known moderate to severe all through lumbar spine from MRI of 2021.     3. Mixed anxiety and depressive disorder  Comments:  some improvement by 30% with cymbalta, obtain seroquel and begin.  Orders:  -     DULoxetine (Cymbalta) 30 MG capsule; Take 1 capsule by mouth 2 (Two) Times a Day. For depression, anxiety and back pain.  Dispense: 60 capsule; Refill: 5    4. Bipolar disorder, current episode depressed, severe, without psychotic features (HCC)  Comments:  obtain seroquel and begin    5. Insomnia, unspecified type  Comments:  a little better with pain relieved during sleep.    6. Benign essential HTN  Comments:  bp not changed has not started the Toprol XL to obtain and begin.     7. Polycythemia vera (HCC)  -     aspirin (aspirin) 81 MG EC tablet; Take 1 tablet by mouth Daily. As secondary prevention of another heart attack and due to polycythemia vera.  Dispense: 90 tablet; Refill: 3               Return in about 12 weeks (around 7/29/2022).  There are no Patient Instructions on file for this visit.        This document has been electronically signed by IGNACIA Colmean on May 6, 2022 15:46 CDT

## 2022-05-11 LAB
6MAM UR QL CFM: NEGATIVE
6MAM/CREAT UR: NOT DETECTED NG/MG CREAT
7AMINOCLONAZEPAM/CREAT UR: NOT DETECTED NG/MG CREAT
A-OH ALPRAZ/CREAT UR: NOT DETECTED NG/MG CREAT
A-OH-TRIAZOLAM/CREAT UR CFM: NOT DETECTED NG/MG CREAT
ALFENTANIL/CREAT UR CFM: NOT DETECTED NG/MG CREAT
ALPHA-HYDROXYMIDAZOLAM, URINE: NOT DETECTED NG/MG CREAT
ALPRAZ/CREAT UR CFM: NOT DETECTED NG/MG CREAT
AMOBARBITAL UR QL CFM: NOT DETECTED
AMPHET/CREAT UR: 72 NG/MG CREAT
AMPHETAMINES UR QL CFM: NORMAL
BARBITAL UR QL CFM: NOT DETECTED
BARBITURATES UR QL CFM: NEGATIVE
BENZODIAZ UR QL CFM: NEGATIVE
BUPRENORPHINE UR QL CFM: NEGATIVE
BUPRENORPHINE/CREAT UR: NOT DETECTED NG/MG CREAT
BUTABARBITAL UR QL CFM: NOT DETECTED
BUTALBITAL UR QL CFM: NOT DETECTED
BZE/CREAT UR: NOT DETECTED NG/MG CREAT
CANNABINOIDS UR QL CFM: NORMAL
CARBOXYTHC/CREAT UR: 14 NG/MG CREAT
CLONAZEPAM/CREAT UR CFM: NOT DETECTED NG/MG CREAT
COCAETHYLENE/CREAT UR CFM: NOT DETECTED NG/MG CREAT
COCAINE UR QL CFM: NEGATIVE
COCAINE/CREAT UR CFM: NOT DETECTED NG/MG CREAT
CODEINE/CREAT UR: NOT DETECTED NG/MG CREAT
CREAT UR-MCNC: 333 MG/DL
DESALKYLFLURAZ/CREAT UR: NOT DETECTED NG/MG CREAT
DESMETHYLFLUNITRAZEPAM: NOT DETECTED NG/MG CREAT
DHC/CREAT UR: 194 NG/MG CREAT
DIAZEPAM/CREAT UR: NOT DETECTED NG/MG CREAT
DRUGS UR: NORMAL
EDDP/CREAT UR: NOT DETECTED NG/MG CREAT
ETHANOL UR CFM-MCNC: NOT DETECTED G/DL
ETHANOL UR QL CFM: NEGATIVE
FENTANYL UR QL CFM: NEGATIVE
FENTANYL/CREAT UR: NOT DETECTED NG/MG CREAT
FLUNITRAZEPAM UR QL CFM: NOT DETECTED NG/MG CREAT
HYDROCODONE/CREAT UR: >3003 NG/MG CREAT
HYDROMORPHONE/CREAT UR: 309 NG/MG CREAT
LEVEL OF DETECTION:: NORMAL
LORAZEPAM/CREAT UR: NOT DETECTED NG/MG CREAT
MDA/CREAT UR: NOT DETECTED NG/MG CREAT
MDMA/CREAT UR: NOT DETECTED NG/MG CREAT
MEPHOBARBITAL UR QL CFM: NOT DETECTED
METHADONE UR QL CFM: NEGATIVE
METHADONE/CREAT UR: NOT DETECTED NG/MG CREAT
METHAMPHET/CREAT UR: 227 NG/MG CREAT
MIDAZOLAM/CREAT UR CFM: NOT DETECTED NG/MG CREAT
MORPHINE/CREAT UR: NOT DETECTED NG/MG CREAT
N-NORTRAMADOL/CREAT UR CFM: NOT DETECTED NG/MG CREAT
NARCOTICS UR: NEGATIVE
NORBUPRENORPHINE/CREAT UR: NOT DETECTED NG/MG CREAT
NORCODEINE/CREAT UR CFM: NOT DETECTED NG/MG CREAT
NORDIAZEPAM/CREAT UR: NOT DETECTED NG/MG CREAT
NORFENTANYL/CREAT UR: NOT DETECTED NG/MG CREAT
NORHYDROCODONE/CREAT UR: >1502 NG/MG CREAT
NORMORPHINE UR-MCNC: NOT DETECTED NG/MG CREAT
NOROXYCODONE/CREAT UR: NOT DETECTED NG/MG CREAT
NOROXYMORPHONE/CREAT UR CFM: NOT DETECTED NG/MG CREAT
O-NORTRAMADOL UR CFM-MCNC: NOT DETECTED NG/MG CREAT
OPIATES UR QL CFM: NORMAL
OXAZEPAM/CREAT UR: NOT DETECTED NG/MG CREAT
OXYCODONE UR QL CFM: NEGATIVE
OXYCODONE/CREAT UR: NOT DETECTED NG/MG CREAT
OXYMORPHONE/CREAT UR: NOT DETECTED NG/MG CREAT
PENTOBARB UR QL CFM: NOT DETECTED
PHENOBARB UR QL CFM: NOT DETECTED
SECOBARBITAL UR QL CFM: NOT DETECTED
SUFENTANIL/CREAT UR CFM: NOT DETECTED NG/MG CREAT
TAPENTADOL UR QL CFM: NEGATIVE
TAPENTADOL/CREAT UR: NOT DETECTED NG/MG CREAT
TEMAZEPAM/CREAT UR: NOT DETECTED NG/MG CREAT
THIOPENTAL UR QL CFM: NOT DETECTED
TRAMADOL UR QL CFM: NOT DETECTED NG/MG CREAT

## 2022-05-24 ENCOUNTER — OFFICE VISIT (OUTPATIENT)
Dept: FAMILY MEDICINE CLINIC | Facility: CLINIC | Age: 57
End: 2022-05-24

## 2022-05-24 VITALS
WEIGHT: 222 LBS | DIASTOLIC BLOOD PRESSURE: 80 MMHG | HEART RATE: 76 BPM | SYSTOLIC BLOOD PRESSURE: 150 MMHG | TEMPERATURE: 97.2 F | RESPIRATION RATE: 18 BRPM | HEIGHT: 71 IN | OXYGEN SATURATION: 99 % | BODY MASS INDEX: 31.08 KG/M2

## 2022-05-24 DIAGNOSIS — M51.36 DDD (DEGENERATIVE DISC DISEASE), LUMBAR: Primary | ICD-10-CM

## 2022-05-24 DIAGNOSIS — R93.7 ABNORMAL MRI, LUMBAR SPINE: ICD-10-CM

## 2022-05-24 DIAGNOSIS — M51.36 BULGING OF INTERVERTEBRAL DISC BETWEEN L4 AND L5: ICD-10-CM

## 2022-05-24 DIAGNOSIS — G47.8 NON-RESTORATIVE SLEEP: ICD-10-CM

## 2022-05-24 DIAGNOSIS — E55.9 VITAMIN D DEFICIENCY: ICD-10-CM

## 2022-05-24 DIAGNOSIS — I10 BENIGN ESSENTIAL HTN: ICD-10-CM

## 2022-05-24 DIAGNOSIS — G47.19 EXCESSIVE DAYTIME SLEEPINESS: ICD-10-CM

## 2022-05-24 DIAGNOSIS — M48.061 NEURAL FORAMINAL STENOSIS OF LUMBAR SPINE: ICD-10-CM

## 2022-05-24 DIAGNOSIS — M54.42 CHRONIC MIDLINE LOW BACK PAIN WITH BILATERAL SCIATICA: Chronic | ICD-10-CM

## 2022-05-24 DIAGNOSIS — D45 POLYCYTHEMIA VERA: ICD-10-CM

## 2022-05-24 DIAGNOSIS — G56.02 CARPAL TUNNEL SYNDROME ON LEFT: ICD-10-CM

## 2022-05-24 DIAGNOSIS — M51.36 BULGING LUMBAR DISC: ICD-10-CM

## 2022-05-24 DIAGNOSIS — F41.8 MIXED ANXIETY AND DEPRESSIVE DISORDER: Chronic | ICD-10-CM

## 2022-05-24 DIAGNOSIS — G89.29 CHRONIC MIDLINE LOW BACK PAIN WITH BILATERAL SCIATICA: ICD-10-CM

## 2022-05-24 DIAGNOSIS — M54.41 CHRONIC MIDLINE LOW BACK PAIN WITH BILATERAL SCIATICA: Chronic | ICD-10-CM

## 2022-05-24 DIAGNOSIS — M62.82 NON-TRAUMATIC RHABDOMYOLYSIS: ICD-10-CM

## 2022-05-24 DIAGNOSIS — G89.29 CHRONIC MIDLINE LOW BACK PAIN WITH BILATERAL SCIATICA: Chronic | ICD-10-CM

## 2022-05-24 DIAGNOSIS — E78.00 ELEVATED LOW DENSITY LIPOPROTEIN (LDL) CHOLESTEROL LEVEL: ICD-10-CM

## 2022-05-24 DIAGNOSIS — R06.83 HABITUAL SNORING: ICD-10-CM

## 2022-05-24 DIAGNOSIS — R20.2 LEFT HAND PARESTHESIA: ICD-10-CM

## 2022-05-24 DIAGNOSIS — E53.8 B12 DEFICIENCY: ICD-10-CM

## 2022-05-24 DIAGNOSIS — K21.9 GASTROESOPHAGEAL REFLUX DISEASE WITHOUT ESOPHAGITIS: ICD-10-CM

## 2022-05-24 DIAGNOSIS — M54.42 CHRONIC MIDLINE LOW BACK PAIN WITH BILATERAL SCIATICA: ICD-10-CM

## 2022-05-24 DIAGNOSIS — M54.41 CHRONIC MIDLINE LOW BACK PAIN WITH BILATERAL SCIATICA: ICD-10-CM

## 2022-05-24 PROBLEM — Z91.89 HISTORY OF DRUG OVERDOSE: Status: ACTIVE | Noted: 2022-05-13

## 2022-05-24 PROBLEM — T50.901A OVERDOSE: Status: ACTIVE | Noted: 2022-05-13

## 2022-05-24 PROCEDURE — 99214 OFFICE O/P EST MOD 30 MIN: CPT | Performed by: NURSE PRACTITIONER

## 2022-05-24 RX ORDER — DULOXETIN HYDROCHLORIDE 30 MG/1
30 CAPSULE, DELAYED RELEASE ORAL 2 TIMES DAILY
Qty: 60 CAPSULE | Refills: 5 | Status: SHIPPED | OUTPATIENT
Start: 2022-05-24 | End: 2023-01-17 | Stop reason: SDUPTHER

## 2022-05-24 RX ORDER — ASPIRIN 81 MG/1
81 TABLET ORAL DAILY
Qty: 90 TABLET | Refills: 3 | Status: SHIPPED | OUTPATIENT
Start: 2022-05-24

## 2022-05-24 RX ORDER — PANTOPRAZOLE SODIUM 40 MG/1
40 TABLET, DELAYED RELEASE ORAL DAILY
Qty: 30 TABLET | Refills: 5 | Status: SHIPPED | OUTPATIENT
Start: 2022-05-24 | End: 2023-02-24 | Stop reason: SDUPTHER

## 2022-05-24 RX ORDER — METOPROLOL SUCCINATE 25 MG/1
25 TABLET, EXTENDED RELEASE ORAL DAILY
Qty: 30 TABLET | Refills: 5 | Status: SHIPPED | OUTPATIENT
Start: 2022-05-24 | End: 2023-02-08 | Stop reason: SDUPTHER

## 2022-05-24 RX ORDER — GABAPENTIN 600 MG/1
600 TABLET ORAL 3 TIMES DAILY
Qty: 90 TABLET | Refills: 0 | Status: SHIPPED | OUTPATIENT
Start: 2022-05-24 | End: 2023-01-17

## 2022-05-24 RX ORDER — ATORVASTATIN CALCIUM 10 MG/1
10 TABLET, FILM COATED ORAL DAILY
Qty: 30 TABLET | Refills: 5 | Status: SHIPPED | OUTPATIENT
Start: 2022-05-24 | End: 2023-02-08 | Stop reason: SDUPTHER

## 2022-05-24 NOTE — PROGRESS NOTES
Subjective   Serafin Nash is a 57 y.o. male.     has Trigger ring finger of right hand; Chronic midline low back pain with bilateral sciatica; Neural foraminal stenosis of lumbar spine; Mixed anxiety and depressive disorder; Chronic midline posterior neck pain; Affective psychosis, bipolar (HCC); Non-restorative sleep; Primary osteoarthritis involving multiple joints; Vitamin D deficiency; Elevated low density lipoprotein (LDL) cholesterol level; Elevated LFTs; Prediabetes; Insomnia; Benign essential HTN; Habitual snoring; Primary osteoarthritis of both hips; B12 deficiency; Chronic hip pain, bilateral; Chronic pain of left knee; Cigarette nicotine dependence without complication; Polycythemia vera (HCC); History of drug overdose; Excessive daytime sleepiness; Carpal tunnel syndrome on left; Left hand paresthesia; Abnormal MRI, lumbar spine; Bulging lumbar disc; Bulging of intervertebral disc between L4 and L5; and DDD (degenerative disc disease), lumbar on their problem list.     Chief Complaint   Patient presents with   • Follow-up     3 week        History of Present Illness   Patient seen in hospital follow-up admission 5/13/2022 to [facility] emergency department with transfer that day to [facility] and acute hypoxic respiratory failure in the setting of illicit drug overdose.  Hospital records are reviewed personally and independently at this time.  UDS was positive for nonprescribed benzodiazepine and nonprescribed tricyclics, methamphetamine, ecstasy as well as marijuana.  He required mechanical ventilation.  Upon arrival to the emergency room he was in sinus tach with short VA and a rate of 162.  Chest x-ray was clear.  CT head clear he was in respiratory alkalosis.  CK was 5700 CK-MB 41.0.  Troponin 64 magnesium 2.0 potassium 3.4 sodium 132.  Hemoglobin 16.5 hematocrit 49.2 and platelets 279.  After admission and transfer to [facility] he was extubated and doing well on room air.  Participated with  physical therapy and needed a walker.  Stable for discharge and directed to follow-up here.  He had leukocytosis deemed to be likely reactive or inflammatory in setting of rhabdomyolysis and drug overdose.  Blood cultures no growth.  ROBERTO with anion gap acidosis resolved.  CK was downtrending appropriately.  He was treated by pulmonologist Dr. Clemente.  On 5/18/2022 at time of hospital discharge discharging physician decided to continue his gabapentin, aspirin, Cymbalta, Toprol-XL 25 mg and stopped Lomotil, Zofran, hydrocodone and Seroquel.    Today he reports: still has weakness of arms and legs but no swelling or pain. Still walking with rolling walker. Will recheck CK to see if this is still falling.   New complaints today: girlfriend reports he is and has long term, been snoring loudly nightly with some respiratory pauses noted. He reports non-restorative sleep and constantly feels excessively tired/ sleepy during the day. Discussed potential for BLAYNE and nocturnal hypoxia and options for testing. He agrees to home sleep study.     MRI lumbar spine 5/19/2022 shows: At L3-4: Moderate to severe spinal canal stenosis from broad-based posterior disc bulge, ligamentum flavum hypertrophy, and facet arthropathy.  There is mild right neural foraminal narrowing with disc bulge.  No significant left neural foraminal narrowing.  L4-5: Moderate to severe spinal canal stenosis from facet arthropathy ligamentum flavum hypertrophy, and broad-based disc bulge.  No significant neuroforaminal narrowing.  Impression: Multilevel spinal canal stenosis most significant at L3-4 and L4-5 due to combination of congenital narrowing and additional findings as discussed above.    Due to signficant findings on L spine MRI and chronic worsening lower back pain with bilateral sciatica, patient will be referred to neurosurgery.   Due to patient previous report that gabapentin was completely ineffective, he will not be continuing on gabapentin.  Opiates will not be resumed due to recent overdose on a multitude of illicit drugs, benzos, tricyclics, methamphetamine and ecstasy requiring mechanical ventilation. He is offered prescription NSAIDs and the gabapentin and he states it helped a little, would like to keep it if I will represcribe.     Other complaints today: none.   Parts of most recent relevant visit HPI, ROS  and PE may be carried forward and all are updated as appropriate for current situation.    Past Surgical History:   Procedure Laterality Date   • APPENDECTOMY     • FOOT SURGERY     • NECK SURGERY  2012   • THUMB CARPOMETACARPAL JOINT ARTHROPLASTY FLEXOR CARPI RADIALIS TENDON     • TONSILLECTOMY        Social History     Socioeconomic History   • Marital status:    Tobacco Use   • Smoking status: Current Every Day Smoker     Packs/day: 1.00   • Smokeless tobacco: Never Used   Substance and Sexual Activity   • Alcohol use: Yes     Alcohol/week: 12.0 standard drinks     Types: 12 Cans of beer per week   • Drug use: No   • Sexual activity: Defer      The following portions of the patient's history were reviewed and updated as appropriate: allergies, current medications, past family history, past medical history, past social history, past surgical history and problem list.    Review of Systems   Constitutional: Negative.  Negative for activity change, appetite change, chills, fever and unexpected weight loss.   HENT: Negative.  Negative for ear pain, postnasal drip, rhinorrhea, sinus pressure, sneezing, sore throat, swollen glands, trouble swallowing and voice change.    Eyes: Negative.  Negative for discharge, redness, itching and visual disturbance.   Respiratory: Negative for cough, shortness of breath and wheezing.    Cardiovascular: Negative.  Negative for chest pain and leg swelling.   Gastrointestinal: Negative.    Endocrine: Negative.  Negative for polydipsia, polyphagia and polyuria.   Genitourinary: Negative.  Negative for  dysuria.   Musculoskeletal: Positive for arthralgias and back pain.   Allergic/Immunologic: Negative.    Neurological: Negative.    Hematological: Negative.    Psychiatric/Behavioral: Positive for sleep disturbance, suicidal ideas (less often) and depressed mood. Negative for behavioral problems and dysphoric mood. The patient is nervous/anxious.    All other systems reviewed and are negative.    PHQ-9 Depression Screening  Little interest or pleasure in doing things?     Feeling down, depressed, or hopeless?     Trouble falling or staying asleep, or sleeping too much?     Feeling tired or having little energy?     Poor appetite or overeating?     Feeling bad about yourself - or that you are a failure or have let yourself or your family down?     Trouble concentrating on things, such as reading the newspaper or watching television?     Moving or speaking so slowly that other people could have noticed? Or the opposite - being so fidgety or restless that you have been moving around a lot more than usual?     Thoughts that you would be better off dead, or of hurting yourself in some way?     PHQ-9 Total Score     If you checked off any problems, how difficult have these problems made it for you to do your work, take care of things at home, or get along with other people?        Objective   Physical Exam  Vitals and nursing note reviewed.   Constitutional:       General: He is not in acute distress.     Appearance: Normal appearance. He is well-developed. He is not ill-appearing or diaphoretic.      Comments: overweight   HENT:      Head: Normocephalic and atraumatic.   Eyes:      General: No scleral icterus.        Right eye: No discharge.         Left eye: No discharge.      Conjunctiva/sclera: Conjunctivae normal.      Pupils: Pupils are equal, round, and reactive to light.   Neck:      Thyroid: No thyromegaly.      Vascular: No carotid bruit or JVD.      Trachea: No tracheal deviation.   Cardiovascular:      Rate  "and Rhythm: Normal rate and regular rhythm.      Pulses: Normal pulses.      Heart sounds: Normal heart sounds. No murmur heard.    No friction rub. No gallop.   Pulmonary:      Effort: Pulmonary effort is normal. No respiratory distress.      Breath sounds: Normal breath sounds. No stridor. No wheezing, rhonchi or rales.   Chest:      Chest wall: No tenderness.   Abdominal:      General: Bowel sounds are normal.      Palpations: Abdomen is soft.   Musculoskeletal:         General: No tenderness or deformity. Normal range of motion.      Cervical back: Normal range of motion and neck supple. No rigidity or tenderness.      Right lower leg: No edema.      Left lower leg: No edema.   Lymphadenopathy:      Cervical: No cervical adenopathy.   Skin:     General: Skin is warm and dry.      Capillary Refill: Capillary refill takes 2 to 3 seconds.      Coloration: Skin is not jaundiced or pale.      Findings: No bruising, erythema, lesion or rash.   Neurological:      General: No focal deficit present.      Mental Status: He is alert and oriented to person, place, and time. Mental status is at baseline.      Motor: No weakness.      Gait: Gait normal.   Psychiatric:         Mood and Affect: Mood normal.         Behavior: Behavior normal.         Thought Content: Thought content normal.         Judgment: Judgment normal.       Vitals:    05/24/22 1301   BP: 150/80   Pulse: 76   Resp: 18   Temp: 97.2 °F (36.2 °C)   SpO2: 99%   Weight: 101 kg (222 lb)   Height: 180.3 cm (71\")   PainSc:   9   PainLoc: Back  Comment: left hand      Body mass index is 30.96 kg/m².  Home sleep study ordered through [facility] with paper referral. Patient prefers home study over in clinic study.   Assessment & Plan   Diagnoses and all orders for this visit:    1. DDD (degenerative disc disease), lumbar (Primary)  -     Ambulatory Referral to Neurosurgery    2. Bulging of intervertebral disc between L4 and L5  -     Ambulatory Referral to " Neurosurgery    3. Bulging lumbar disc  -     Ambulatory Referral to Neurosurgery    4. Neural foraminal stenosis of lumbar spine  -     Ambulatory Referral to Neurosurgery    5. Chronic midline low back pain with bilateral sciatica  -     Ambulatory Referral to Neurosurgery  -     gabapentin (NEURONTIN) 600 MG tablet; Take 1 tablet by mouth 3 (Three) Times a Day. For low back pain with bilateral sciatica  Dispense: 90 tablet; Refill: 0  -     DULoxetine (Cymbalta) 30 MG capsule; Take 1 capsule by mouth 2 (Two) Times a Day. For depression, anxiety and back pain.  Dispense: 60 capsule; Refill: 5    6. Abnormal MRI, lumbar spine  -     Ambulatory Referral to Neurosurgery    7. Non-traumatic rhabdomyolysis  -     CK; Future    8. Left hand paresthesia  -     Ambulatory Referral to Orthopedic Surgery    9. Carpal tunnel syndrome on left  -     Ambulatory Referral to Orthopedic Surgery    10. Habitual snoring    11. Non-restorative sleep    12. Excessive daytime sleepiness    13. Elevated low density lipoprotein (LDL) cholesterol level  -     atorvastatin (LIPITOR) 10 MG tablet; Take 1 tablet by mouth Daily. For cholesterol  Dispense: 30 tablet; Refill: 5    14. Benign essential HTN  -     metoprolol succinate XL (TOPROL-XL) 25 MG 24 hr tablet; Take 1 tablet by mouth Daily.  Dispense: 30 tablet; Refill: 5    15. B12 deficiency  -     vitamin B-12 (CYANOCOBALAMIN) 250 MCG tablet; Take 1 tablet by mouth Daily. For B12 deficiency  Dispense: 30 tablet; Refill: 5    16. Chronic midline low back pain with bilateral sciatica  Comments:  hydrocodone helps him sleep but not much relief during the day. will proceed with updated MRI on 5/19 as planned and referr to Dr Breen, neurosurgery.  Orders:  -     Ambulatory Referral to Neurosurgery  -     gabapentin (NEURONTIN) 600 MG tablet; Take 1 tablet by mouth 3 (Three) Times a Day. For low back pain with bilateral sciatica  Dispense: 90 tablet; Refill: 0  -     DULoxetine (Cymbalta)  30 MG capsule; Take 1 capsule by mouth 2 (Two) Times a Day. For depression, anxiety and back pain.  Dispense: 60 capsule; Refill: 5    17. Mixed anxiety and depressive disorder  Comments:  some improvement by 30% with cymbalta, obtain seroquel and begin.  Orders:  -     DULoxetine (Cymbalta) 30 MG capsule; Take 1 capsule by mouth 2 (Two) Times a Day. For depression, anxiety and back pain.  Dispense: 60 capsule; Refill: 5    18. Vitamin D deficiency  -     cholecalciferol (VITAMIN D3) 1.25 MG (78853 UT) capsule; Take 1 capsule by mouth Every 7 (Seven) Days. Low vitamin D level  Dispense: 12 capsule; Refill: 1    19. Polycythemia vera (HCC)  -     aspirin (aspirin) 81 MG EC tablet; Take 1 tablet by mouth Daily. As secondary prevention of another heart attack and due to polycythemia vera.  Dispense: 90 tablet; Refill: 3    20. Gastroesophageal reflux disease without esophagitis  -     pantoprazole (PROTONIX) 40 MG EC tablet; Take 1 tablet by mouth Daily.  Dispense: 30 tablet; Refill: 5        Return in about 12 weeks (around 8/16/2022).  There are no Patient Instructions on file for this visit.             This document has been electronically signed by IGNACIA Coleman on May 24, 2022 14:00 CDT

## 2022-05-25 DIAGNOSIS — M48.061 NEURAL FORAMINAL STENOSIS OF LUMBAR SPINE: ICD-10-CM

## 2022-05-25 DIAGNOSIS — M51.36 DDD (DEGENERATIVE DISC DISEASE), LUMBAR: ICD-10-CM

## 2022-05-25 DIAGNOSIS — M54.41 CHRONIC MIDLINE LOW BACK PAIN WITH BILATERAL SCIATICA: ICD-10-CM

## 2022-05-25 DIAGNOSIS — G89.29 CHRONIC MIDLINE LOW BACK PAIN WITH BILATERAL SCIATICA: ICD-10-CM

## 2022-05-25 DIAGNOSIS — M54.42 CHRONIC MIDLINE LOW BACK PAIN WITH BILATERAL SCIATICA: ICD-10-CM

## 2022-05-31 ENCOUNTER — TELEPHONE (OUTPATIENT)
Dept: FAMILY MEDICINE CLINIC | Facility: CLINIC | Age: 57
End: 2022-05-31

## 2022-06-02 DIAGNOSIS — M79.642 LEFT HAND PAIN: Primary | ICD-10-CM

## 2022-06-06 ENCOUNTER — TELEPHONE (OUTPATIENT)
Dept: FAMILY MEDICINE CLINIC | Facility: CLINIC | Age: 57
End: 2022-06-06

## 2022-06-06 NOTE — TELEPHONE ENCOUNTER
Have tried reaching the patient several times could not sending a note for him to contact the office.

## 2022-07-08 DIAGNOSIS — M79.641 RIGHT HAND PAIN: ICD-10-CM

## 2022-07-08 DIAGNOSIS — M79.642 LEFT HAND PAIN: Primary | ICD-10-CM

## 2022-07-14 ENCOUNTER — OFFICE VISIT (OUTPATIENT)
Dept: ORTHOPEDIC SURGERY | Facility: CLINIC | Age: 57
End: 2022-07-14

## 2022-07-14 VITALS — WEIGHT: 214 LBS | BODY MASS INDEX: 29.96 KG/M2 | HEIGHT: 71 IN

## 2022-07-14 DIAGNOSIS — M79.641 BILATERAL HAND PAIN: Primary | ICD-10-CM

## 2022-07-14 DIAGNOSIS — R20.2 NUMBNESS AND TINGLING IN BOTH HANDS: ICD-10-CM

## 2022-07-14 DIAGNOSIS — M25.532 PAIN IN BOTH WRISTS: ICD-10-CM

## 2022-07-14 DIAGNOSIS — G56.03 BILATERAL CARPAL TUNNEL SYNDROME: ICD-10-CM

## 2022-07-14 DIAGNOSIS — G56.22 CUBITAL TUNNEL SYNDROME ON LEFT: ICD-10-CM

## 2022-07-14 DIAGNOSIS — R20.0 NUMBNESS AND TINGLING IN BOTH HANDS: ICD-10-CM

## 2022-07-14 DIAGNOSIS — M79.632 PAIN OF LEFT FOREARM: ICD-10-CM

## 2022-07-14 DIAGNOSIS — M79.642 BILATERAL HAND PAIN: Primary | ICD-10-CM

## 2022-07-14 DIAGNOSIS — M25.531 PAIN IN BOTH WRISTS: ICD-10-CM

## 2022-07-14 PROCEDURE — 99214 OFFICE O/P EST MOD 30 MIN: CPT | Performed by: NURSE PRACTITIONER

## 2022-07-14 NOTE — PROGRESS NOTES
Serafin Nash is a 57 y.o. male   Primary provider:  Hollie Walden APRN       Chief Complaint   Patient presents with   • Left Hand - Pain, Initial Evaluation, Numbness   • Right Hand - Pain, Initial Evaluation, Numbness     HISTORY OF PRESENT ILLNESS:    57-year-old male patient presents to office for evaluation of chronic bilateral wrist/hand pain with numbness and tingling.  The patient complains of worse pain in the left wrist/hand compared to the right.  Initial onset of pain/symptoms occurred approximately 6 years ago and have progressively worsened over time.  Patient currently describes severe pain affecting the left wrist/hand that is constant.  Patient also describes pain that radiates from his left elbow down the ulnar aspect of his left forearm and into his left hand.  Patient has milder symptoms in the right hand and reports numbness in the distal aspects of his thumb and index finger only.  Pain is described as constant and severe.  Pain is described as aching, burning and stabbing in nature with associated popping sensations and swelling.  Pain is worse with driving and movement/use of his bilateral wrists/hands.  Patient also reports nighttime pain that disrupts his sleep.  Patient states he has tried wearing wrist control splints but these offered no improvement.  Patient already takes Neurontin 600 mg 3 times daily as a regular medication as prescribed by his PCP but it is not helping his current pain/symptoms.  X-rays of the bilateral hands and elbows performed in office today.  Pain scale currently is 8/10.    CONCURRENT MEDICAL HISTORY:    Past Medical History:   Diagnosis Date   • Alcohol abuse    • Anxiety    • Arthritis    • Benign essential HTN 5/6/2022   • Chronic midline low back pain with bilateral sciatica 5/6/2022   • Depression    • GERD (gastroesophageal reflux disease)    • Headache    • Low back pain    • Polycythemia vera (HCC) 5/6/2022   • Stomach ulcer        No  Known Allergies      Current Outpatient Medications:   •  aspirin (aspirin) 81 MG EC tablet, Take 1 tablet by mouth Daily. As secondary prevention of another heart attack and due to polycythemia vera., Disp: 90 tablet, Rfl: 3  •  cholecalciferol (VITAMIN D3) 1.25 MG (70095 UT) capsule, Take 1 capsule by mouth Every 7 (Seven) Days. Low vitamin D level, Disp: 12 capsule, Rfl: 1  •  Diclofenac Sodium (VOLTAREN) 1 % gel gel, Apply 4 g topically to the appropriate area as directed 2 (Two) Times a Day., Disp: 100 g, Rfl: 5  •  DULoxetine (Cymbalta) 30 MG capsule, Take 1 capsule by mouth 2 (Two) Times a Day. For depression, anxiety and back pain., Disp: 60 capsule, Rfl: 5  •  gabapentin (NEURONTIN) 600 MG tablet, Take 1 tablet by mouth 3 (Three) Times a Day. For low back pain with bilateral sciatica, Disp: 90 tablet, Rfl: 0  •  metoprolol succinate XL (TOPROL-XL) 25 MG 24 hr tablet, Take 1 tablet by mouth Daily., Disp: 30 tablet, Rfl: 5  •  pantoprazole (PROTONIX) 40 MG EC tablet, Take 1 tablet by mouth Daily., Disp: 30 tablet, Rfl: 5  •  vitamin B-12 (CYANOCOBALAMIN) 250 MCG tablet, Take 1 tablet by mouth Daily. For B12 deficiency, Disp: 30 tablet, Rfl: 5  •  atorvastatin (LIPITOR) 10 MG tablet, Take 1 tablet by mouth Daily. For cholesterol, Disp: 30 tablet, Rfl: 5    Past Surgical History:   Procedure Laterality Date   • APPENDECTOMY     • FOOT SURGERY     • NECK SURGERY  2012   • THUMB CARPOMETACARPAL JOINT ARTHROPLASTY FLEXOR CARPI RADIALIS TENDON     • TONSILLECTOMY         Family History   Problem Relation Age of Onset   • Arthritis Mother    • Asthma Mother    • Diabetes Mother    • Hypertension Mother    • Alcohol abuse Father    • Hypertension Father    • Anxiety disorder Sister    • Arthritis Sister    • Asthma Sister    • Mental illness Sister    • Alcohol abuse Brother    • Hypertension Brother    • Stroke Brother    • Stroke Maternal Grandmother         Social History     Socioeconomic History   • Marital  "status:    Tobacco Use   • Smoking status: Current Every Day Smoker     Packs/day: 1.00   • Smokeless tobacco: Never Used   Substance and Sexual Activity   • Alcohol use: Yes     Alcohol/week: 12.0 standard drinks     Types: 12 Cans of beer per week   • Drug use: No   • Sexual activity: Defer        Review of Systems   HENT: Negative.    Eyes: Negative.    Respiratory: Negative.    Cardiovascular: Negative.    Gastrointestinal: Negative.    Endocrine: Negative.    Genitourinary: Negative.    Musculoskeletal:        Bilateral wrist/hand pain, Left > Right. Left elbow/forearm pain. Joint stiffness. Muscle pain.    Skin: Negative.    Allergic/Immunologic: Negative.    Hematological: Negative.    Psychiatric/Behavioral: Positive for sleep disturbance. The patient is nervous/anxious.        PHYSICAL EXAMINATION:       Ht 180.3 cm (71\")   Wt 97.1 kg (214 lb)   BMI 29.85 kg/m²     Physical Exam  Vitals reviewed.   Constitutional:       General: He is not in acute distress.     Appearance: He is well-developed. He is not ill-appearing.   HENT:      Head: Normocephalic.   Pulmonary:      Effort: Pulmonary effort is normal. No respiratory distress.   Abdominal:      General: There is no distension.      Palpations: Abdomen is soft.   Musculoskeletal:         General: Tenderness (Mild, left wrist, left elbow) present. No swelling, deformity or signs of injury.   Skin:     General: Skin is warm and dry.      Capillary Refill: Capillary refill takes less than 2 seconds.      Findings: No erythema.   Neurological:      Mental Status: He is alert and oriented to person, place, and time.      GCS: GCS eye subscore is 4. GCS verbal subscore is 5. GCS motor subscore is 6.      Sensory: Sensory deficit (Bilateral hands/fingers) present.   Psychiatric:         Speech: Speech normal.         Behavior: Behavior normal.         Thought Content: Thought content normal.         Judgment: Judgment normal.         GAIT:     []  " Normal  [x]  Antalgic    Assistive device: []  None  []  Walker     []  Crutches  [x]  Cane     []  Wheelchair  []  Stretcher    Right Hand Exam     Tenderness   The patient is experiencing no tenderness.     Range of Motion   The patient has normal right wrist ROM.     Muscle Strength   Wrist extension: 5/5   Wrist flexion: 5/5   : 4/5     Tests   Tinel's sign (median nerve): positive    Other   Erythema: absent  Sensation: decreased (thumb, index finger)  Pulse: present      Left Hand Exam     Tenderness   Left hand tenderness location: Mild, diffuse.     Range of Motion   The patient has normal left wrist ROM.    Muscle Strength   Wrist extension: 4/5   Wrist flexion: 4/5   :  4/5     Tests   Tinel's sign (median nerve): positive    Other   Erythema: absent  Sensation: decreased  Pulse: present      Left Elbow Exam     Tenderness   Left elbow tenderness location: Mild.     Range of Motion   The patient has normal left elbow ROM.    Muscle Strength   Left elbow normal strength: deferred.    Tests   Tinel's sign (cubital tunnel): positive    Other   Erythema: absent  Sensation: decreased  Pulse: present            XR elbow 3+ vw bilateral    Result Date: 7/14/2022  Narrative: Comparison: None Indication: Pain Findings: Three views of the right elbow are obtained. Three views of the left upper obtained. Right elbow: There is normal alignment. No fracture or dislocation. Mild joint space narrowing. Small coronoid process osteophyte. No joint effusion. Olecranon spur. Left elbow: There is normal alignment. No fracture or dislocation. Mild joint space narrowing. Small coronoid process osteophyte. No joint effusion. Olecranon spur.     Impression: Impression: 1. Mild degeneration of both elbows. 2. Bilateral elbow olecranon spurs. Electronically signed by:  Eugenio Emery MD  7/14/2022 3:47 PM CDT Workstation: [id number]    XR Hand 3+ View Bilateral    Result Date: 7/14/2022  Narrative: PROCEDURE: XR HAND 3+ VW  BILATERAL VIEWS: 3 views each INDICATION: Pain COMPARISON: None FINDINGS: Right hand   - fracture: None   - alignment: WNL   - misc: Carpometacarpal degenerative change. There is also mild degenerative change of the interphalangeal joint of the thumb. Mild and radiocarpal degenerative changes are present. No erosions are visualized. No significant soft tissue abnormality. Left  hand    - fracture: None   - alignment: WNL   - misc:  Carpometacarpal degenerative change. There is also mild degenerative change of the interphalangeal joint of the thumb. Mild and radiocarpal degenerative changes are present. No erosions are visualized. No significant soft tissue abnormality     Impression: Bilateral multifocal osteoarthrosis. (Note:  if pain or symptoms persist beyond reasonable expectations and follow-up imaging is anticipated,  scintigraphic or cross sectional imaging (CT and/or MRI) is suggested, as is deemed clinically appropriate). Electronically signed by:  Akosua Mcpherson MD  7/14/2022 3:58 PM CDT Workstation: [id number]    ASSESSMENT:    Diagnoses and all orders for this visit:    Bilateral hand pain  -     Cancel: XR Elbow 2 View Bilateral  -     XR elbow 3+ vw bilateral  -     EMG & Nerve Conduction Test; Future    Bilateral carpal tunnel syndrome  -     EMG & Nerve Conduction Test; Future    Pain of left forearm  -     EMG & Nerve Conduction Test; Future    Cubital tunnel syndrome on left  -     EMG & Nerve Conduction Test; Future    Pain in both wrists  -     EMG & Nerve Conduction Test; Future    Numbness and tingling in both hands  -     EMG & Nerve Conduction Test; Future    PLAN    X-rays of the bilateral hands and bilateral elbows performed in office today and reviewed with no acute findings noted.  Patient has some mild degenerative changes noted in both wrist joints and interphalangeal joints with more moderate degenerative changes at the first carpometacarpal joints.  There are some mild degenerative  changes noted in each elbow joint with spurring at the olecranons but no significant degenerative changes noted.  Subjective complaints and physical exam are consistent with suspected carpal tunnel syndrome with worse pain/symptoms affecting the left hand.  Patient also has symptoms consistent with suspected left-sided cubital tunnel syndrome.  Recommend EMG-NCS for further evaluation.  We discussed that he will likely need surgical consult once testing has been performed and the diagnosis is confirmed.    Recommend the following:  -Rest and activity modification during times of increased pain with avoidance of repetitive motions and over exertional use with the bilateral wrists/hands or any activities that exacerbate his pain/symptoms.  -Use of wrist control splints to help control symptoms.  -Elevation and ice therapy to the left elbow and bilateral wrists as needed to control swelling and minimize pain/inflammation.  -Tylenol, Aleve or Ibuprofen as needed for pain/discomfort.  -Continue with Neurontin 600 mg 3 times daily as previously prescribed by his PCP.    Follow-up after EMG-NCS to discuss results and further treatment recommendations.  Anticipate surgical consult if indicated.    Time spent of a minimum of 30 minutes including the face to face evaluation, reviewing of medical history and prior medial records, reviewing of diagnostic studies, ordering additional tests, documentation, patient education and coordination of care.     EMR Dragon/Ment[employer] Disclaimer: Some of this note may be an electronic transcription/translation of spoken language to printed text using the Dragon Dictation System.     Return for follow up after EMG-NCS.        This document has been electronically signed by IGNACIA Tong on July 17, 2022 16:23 CDT      IGNACIA Tong

## 2022-07-17 PROBLEM — M25.532 PAIN IN BOTH WRISTS: Status: ACTIVE | Noted: 2022-07-17

## 2022-07-17 PROBLEM — M79.641 BILATERAL HAND PAIN: Status: ACTIVE | Noted: 2022-07-17

## 2022-07-17 PROBLEM — G56.22 CUBITAL TUNNEL SYNDROME ON LEFT: Status: ACTIVE | Noted: 2022-07-17

## 2022-07-17 PROBLEM — M79.642 BILATERAL HAND PAIN: Status: ACTIVE | Noted: 2022-07-17

## 2022-07-17 PROBLEM — M79.632 PAIN OF LEFT FOREARM: Status: ACTIVE | Noted: 2022-07-17

## 2022-07-17 PROBLEM — M25.531 PAIN IN BOTH WRISTS: Status: ACTIVE | Noted: 2022-07-17

## 2022-07-17 PROBLEM — G56.03 BILATERAL CARPAL TUNNEL SYNDROME: Status: ACTIVE | Noted: 2022-07-17

## 2022-07-26 ENCOUNTER — LAB (OUTPATIENT)
Dept: LAB | Facility: OTHER | Age: 57
End: 2022-07-26

## 2022-07-26 ENCOUNTER — TRANSCRIBE ORDERS (OUTPATIENT)
Dept: GENERAL RADIOLOGY | Facility: CLINIC | Age: 57
End: 2022-07-26

## 2022-07-26 ENCOUNTER — OFFICE VISIT (OUTPATIENT)
Dept: FAMILY MEDICINE CLINIC | Facility: CLINIC | Age: 57
End: 2022-07-26

## 2022-07-26 VITALS
DIASTOLIC BLOOD PRESSURE: 82 MMHG | SYSTOLIC BLOOD PRESSURE: 128 MMHG | HEART RATE: 74 BPM | WEIGHT: 216 LBS | HEIGHT: 71 IN | BODY MASS INDEX: 30.24 KG/M2 | OXYGEN SATURATION: 97 %

## 2022-07-26 DIAGNOSIS — Z01.818 OTHER SPECIFIED PRE-OPERATIVE EXAMINATION: ICD-10-CM

## 2022-07-26 DIAGNOSIS — M48.062 PSEUDOCLAUDICATION SYNDROME: ICD-10-CM

## 2022-07-26 DIAGNOSIS — Z01.818 PREOPERATIVE CLEARANCE: Primary | ICD-10-CM

## 2022-07-26 DIAGNOSIS — Z01.818 OTHER SPECIFIED PRE-OPERATIVE EXAMINATION: Primary | ICD-10-CM

## 2022-07-26 DIAGNOSIS — M62.82 NON-TRAUMATIC RHABDOMYOLYSIS: ICD-10-CM

## 2022-07-26 LAB
ANION GAP SERPL CALCULATED.3IONS-SCNC: 7 MMOL/L (ref 5–15)
APTT PPP: 29.9 SECONDS (ref 20–40.3)
BASOPHILS # BLD AUTO: 0.03 10*3/MM3 (ref 0–0.2)
BASOPHILS NFR BLD AUTO: 0.4 % (ref 0–1.5)
BUN SERPL-MCNC: 10 MG/DL (ref 7–23)
BUN/CREAT SERPL: 11.4 (ref 7–25)
CALCIUM SPEC-SCNC: 9.1 MG/DL (ref 8.4–10.2)
CHLORIDE SERPL-SCNC: 101 MMOL/L (ref 101–112)
CK SERPL-CCNC: 59 U/L (ref 20–200)
CO2 SERPL-SCNC: 32 MMOL/L (ref 22–30)
CREAT SERPL-MCNC: 0.88 MG/DL (ref 0.7–1.3)
DEPRECATED RDW RBC AUTO: 44 FL (ref 37–54)
EGFRCR SERPLBLD CKD-EPI 2021: 99.7 ML/MIN/1.73
EOSINOPHIL # BLD AUTO: 0.34 10*3/MM3 (ref 0–0.4)
EOSINOPHIL NFR BLD AUTO: 4.1 % (ref 0.3–6.2)
ERYTHROCYTE [DISTWIDTH] IN BLOOD BY AUTOMATED COUNT: 13.2 % (ref 12.3–15.4)
GLUCOSE SERPL-MCNC: 140 MG/DL (ref 70–99)
HCT VFR BLD AUTO: 46.8 % (ref 37.5–51)
HGB BLD-MCNC: 15.3 G/DL (ref 13–17.7)
INR PPP: 1.06 (ref 0.8–1.2)
LYMPHOCYTES # BLD AUTO: 2.78 10*3/MM3 (ref 0.7–3.1)
LYMPHOCYTES NFR BLD AUTO: 33.6 % (ref 19.6–45.3)
MCH RBC QN AUTO: 30.1 PG (ref 26.6–33)
MCHC RBC AUTO-ENTMCNC: 32.7 G/DL (ref 31.5–35.7)
MCV RBC AUTO: 92.1 FL (ref 79–97)
MONOCYTES # BLD AUTO: 0.94 10*3/MM3 (ref 0.1–0.9)
MONOCYTES NFR BLD AUTO: 11.4 % (ref 5–12)
NEUTROPHILS NFR BLD AUTO: 4.19 10*3/MM3 (ref 1.7–7)
NEUTROPHILS NFR BLD AUTO: 50.5 % (ref 42.7–76)
PLATELET # BLD AUTO: 244 10*3/MM3 (ref 140–450)
PMV BLD AUTO: 10.8 FL (ref 6–12)
POTASSIUM SERPL-SCNC: 4 MMOL/L (ref 3.4–5)
PROTHROMBIN TIME: 13.6 SECONDS (ref 11.1–15.3)
RBC # BLD AUTO: 5.08 10*6/MM3 (ref 4.14–5.8)
SODIUM SERPL-SCNC: 140 MMOL/L (ref 137–145)
WBC NRBC COR # BLD: 8.28 10*3/MM3 (ref 3.4–10.8)

## 2022-07-26 PROCEDURE — 99213 OFFICE O/P EST LOW 20 MIN: CPT | Performed by: NURSE PRACTITIONER

## 2022-07-26 PROCEDURE — 85025 COMPLETE CBC W/AUTO DIFF WBC: CPT | Performed by: INTERNAL MEDICINE

## 2022-07-26 PROCEDURE — 85730 THROMBOPLASTIN TIME PARTIAL: CPT | Performed by: NEUROLOGICAL SURGERY

## 2022-07-26 PROCEDURE — 82550 ASSAY OF CK (CPK): CPT | Performed by: NURSE PRACTITIONER

## 2022-07-26 PROCEDURE — 93005 ELECTROCARDIOGRAM TRACING: CPT | Performed by: NURSE PRACTITIONER

## 2022-07-26 PROCEDURE — 80048 BASIC METABOLIC PNL TOTAL CA: CPT | Performed by: INTERNAL MEDICINE

## 2022-07-26 PROCEDURE — 85610 PROTHROMBIN TIME: CPT | Performed by: INTERNAL MEDICINE

## 2022-07-26 RX ORDER — ZIPRASIDONE HYDROCHLORIDE 40 MG/1
80 CAPSULE ORAL 2 TIMES DAILY
COMMUNITY
Start: 2022-07-21

## 2022-07-26 RX ORDER — LAMOTRIGINE 25 MG/1
100 TABLET ORAL EVERY MORNING
COMMUNITY
Start: 2022-07-21

## 2022-07-26 NOTE — PROGRESS NOTES
CC: Surgical Clearance (Pt is scheduled to have a lamenectomy on 08/04/2022 performed by Dr Breen. )      Subjective:  Serafin Nash is a 57 y.o. male who presents for     Patient of IGNACIA Arteaga presents to office for surgical clearance for a laminectomy to be performed by Dr. Breen on 8/4/2022.  He has degenerative disc disease, bulging lumbar disc, hyperlipidemia, hypertension, anxiety and depression, vitamin D deficiency, polycythemia, and GERD.  He is doing well on his current medications.  He is already had labs drawn for Dr. Breen and had CXR this morning. Just needs an EKG for clearance.      The following portions of the patient's history were reviewed and updated as appropriate: allergies, current medications, past family history, past medical history, past social history, past surgical history and problem list.    Past Medical History:   Diagnosis Date   • Alcohol abuse    • Anxiety    • Arthritis    • Benign essential HTN 5/6/2022   • Chronic midline low back pain with bilateral sciatica 5/6/2022   • Depression    • GERD (gastroesophageal reflux disease)    • Headache    • Low back pain    • Polycythemia vera (HCC) 5/6/2022   • Stomach ulcer          Current Outpatient Medications:   •  aspirin (aspirin) 81 MG EC tablet, Take 1 tablet by mouth Daily. As secondary prevention of another heart attack and due to polycythemia vera., Disp: 90 tablet, Rfl: 3  •  atorvastatin (LIPITOR) 10 MG tablet, Take 1 tablet by mouth Daily. For cholesterol, Disp: 30 tablet, Rfl: 5  •  cholecalciferol (VITAMIN D3) 1.25 MG (25168 UT) capsule, Take 1 capsule by mouth Every 7 (Seven) Days. Low vitamin D level, Disp: 12 capsule, Rfl: 1  •  DULoxetine (Cymbalta) 30 MG capsule, Take 1 capsule by mouth 2 (Two) Times a Day. For depression, anxiety and back pain., Disp: 60 capsule, Rfl: 5  •  gabapentin (NEURONTIN) 600 MG tablet, Take 1 tablet by mouth 3 (Three) Times a Day. For low back pain with bilateral sciatica, Disp:  "90 tablet, Rfl: 0  •  lamoTRIgine (LaMICtal) 25 MG tablet, Take 25 mg by mouth Every Morning., Disp: , Rfl:   •  pantoprazole (PROTONIX) 40 MG EC tablet, Take 1 tablet by mouth Daily., Disp: 30 tablet, Rfl: 5  •  ziprasidone (GEODON) 40 MG capsule, Take 40 mg by mouth 2 (Two) Times a Day., Disp: , Rfl:   •  metoprolol succinate XL (TOPROL-XL) 25 MG 24 hr tablet, Take 1 tablet by mouth Daily., Disp: 30 tablet, Rfl: 5  •  vitamin B-12 (CYANOCOBALAMIN) 250 MCG tablet, Take 1 tablet by mouth Daily. For B12 deficiency, Disp: 30 tablet, Rfl: 5    Review of Systems    Review of Systems   Constitutional: Negative.    HENT: Negative.    Eyes: Negative.    Respiratory: Negative.    Cardiovascular: Negative.    Gastrointestinal: Negative.    Endocrine: Negative.    Genitourinary: Negative.    Musculoskeletal: Negative.    Skin: Negative.    Allergic/Immunologic: Negative.    Neurological: Negative.    Hematological: Negative.    Psychiatric/Behavioral: Negative.    All other systems reviewed and are negative.      Objective  Vitals:    07/26/22 0915   BP: 128/82   Pulse: 74   SpO2: 97%   Weight: 98 kg (216 lb)   Height: 180.3 cm (71\")     Body mass index is 30.13 kg/m².      Physical Exam    Physical Exam  Vitals and nursing note reviewed.   Constitutional:       General: He is not in acute distress.     Appearance: Normal appearance. He is not ill-appearing, toxic-appearing or diaphoretic.   HENT:      Head: Normocephalic and atraumatic.   Neck:      Vascular: No carotid bruit.   Cardiovascular:      Rate and Rhythm: Normal rate and regular rhythm.      Pulses: Normal pulses.      Heart sounds: Normal heart sounds. No murmur heard.    No friction rub. No gallop.   Pulmonary:      Effort: Pulmonary effort is normal. No respiratory distress.      Breath sounds: Normal breath sounds. No stridor. No wheezing, rhonchi or rales.   Chest:      Chest wall: No tenderness.   Musculoskeletal:      Cervical back: Normal range of motion " and neck supple. No rigidity or tenderness.   Lymphadenopathy:      Cervical: No cervical adenopathy.   Neurological:      Mental Status: He is alert.         ECG 12 Lead    Date/Time: 7/26/2022 9:35 AM  Performed by: Roseline Rogers APRN  Authorized by: Roseline Rogers APRN   Previous ECG: no previous ECG available  Rhythm: sinus rhythm  Rate: normal  BPM: 65  Conduction: conduction normal  Q waves: all    ST Segments: ST segments normal  T Waves: T waves normal  QRS axis: normal  Other: no other findings    Clinical impression: normal ECG              Diagnoses and all orders for this visit:    1. Preoperative clearance (Primary)  -     ECG 12 Lead       Patient is cleared from a medical standpoint to proceed with surgical procedure.  Patient advised to keep his scheduled follow-up appointment with his PCP IGNACIA Arteaga.  Answered all questions.  Patient verbalized understanding of plan of care.          This document has been electronically signed by IGNACIA Brady on July 26, 2022 09:40 CDT

## 2022-08-05 ENCOUNTER — HOME HEALTH ADMISSION (OUTPATIENT)
Dept: HOME HEALTH SERVICES | Facility: HOME HEALTHCARE | Age: 57
End: 2022-08-05

## 2022-08-10 ENCOUNTER — TELEPHONE (OUTPATIENT)
Dept: ORTHOPEDIC SURGERY | Facility: CLINIC | Age: 57
End: 2022-08-10

## 2022-08-10 NOTE — TELEPHONE ENCOUNTER
Ok, thanks. But I would assume they would just schedule him at a later date? That doesn't mean he can never leave his home again. Nor does it mean I want them to cancel or disregard the order. He can have it done in the next few weeks as common sense would dictate. Thanks.

## 2022-08-10 NOTE — TELEPHONE ENCOUNTER
MIKE  [facility] CALLED STATING THAT THE PATIENT HAD RECENT BACK SURGERY AND CAN NOT DO THE EMG AT THIS TIME SINCE HE IS UNABLE TO GET OUT OF HIS HOUSE.

## 2022-09-20 ENCOUNTER — OFFICE VISIT (OUTPATIENT)
Dept: SLEEP MEDICINE | Facility: HOSPITAL | Age: 57
End: 2022-09-20

## 2022-09-20 VITALS
WEIGHT: 217.6 LBS | HEIGHT: 71 IN | HEART RATE: 120 BPM | OXYGEN SATURATION: 95 % | SYSTOLIC BLOOD PRESSURE: 149 MMHG | DIASTOLIC BLOOD PRESSURE: 89 MMHG | BODY MASS INDEX: 30.46 KG/M2

## 2022-09-20 DIAGNOSIS — R06.81 WITNESSED EPISODE OF APNEA: ICD-10-CM

## 2022-09-20 DIAGNOSIS — G47.19 EXCESSIVE DAYTIME SLEEPINESS: ICD-10-CM

## 2022-09-20 DIAGNOSIS — F51.04 PSYCHOPHYSIOLOGICAL INSOMNIA: ICD-10-CM

## 2022-09-20 DIAGNOSIS — G47.61 PLMD (PERIODIC LIMB MOVEMENT DISORDER): ICD-10-CM

## 2022-09-20 DIAGNOSIS — R06.83 SNORING: Primary | ICD-10-CM

## 2022-09-20 PROCEDURE — 99213 OFFICE O/P EST LOW 20 MIN: CPT | Performed by: NURSE PRACTITIONER

## 2022-09-20 NOTE — PROGRESS NOTES
New Patient Sleep Medicine Consultation    Encounter Date: 9/20/2022         Patient's PCP: Karolyn Chew APRN  Referring provider: self referral   Reason for consultation chief complaint: snoring, loud disruptive snoring, awakening gasping for breath, witnessed apneas, excessive daytime sleepiness, unrefreshing sleep and insomnia    Serafin Nash is a 57 y.o. male whose bedtime is ~ 0000. He  falls asleep after 0-60 minutes, and is up 2-5 times per night. Up to the bathroom and tossing and turning. Sometimes takes a while to fall back asleep.  He wakes up ~ 0600. On weekend may sleep in later.  He endorses 4-5 hours of sleep. He drinks 1-2 cups of coffee, 0 teas, and 4 sodas per day. He drinks 0 alcoholic beverages per week.      Serafin Nash admits to snoring, unrestful sleep, High blod pressure, decreased libido, excessive daytime sleepiness, stop breathing during sleep, irritability, Disturbed or restless sleep, memory loss, Up to the bathroom at night, sleepy driving, teeth grinding, restless legs at night, difficulty falling asleep and difficulty staying asleep for >5 years.States leg jerking prior to bed.States his significant other reports leg movement and jerking during sleep.  He denies cataplexy, sleep paralysis, or hypnagogic hallucinations. He takes no medicine for sleep. He has occasional sleepiness with driving. He denies accidents related to falling asleep at the wheel.  He naps occasionally. He has never had a sleep study. He sleeps in a bed with his significant other and sometimes small dog.     He takes Cymbalta in AM and before bed. Takes Lamictal in AM. Takes Geodon twice daily. Taking gabapentin for mood and nerve pain.   He sees behavioral health.     He continues to smoke. Smoking history: smoked 1 ppds from age 7 until current. Advised to quit.       Marital status: single   Occupation: retired from retail   Children: 2  FH of sleep disorders: not that he knows of  "      Past Medical History:   Diagnosis Date   • Alcohol abuse    • Anxiety    • Arthritis    • Benign essential HTN 5/6/2022   • Chronic midline low back pain with bilateral sciatica 5/6/2022   • Depression    • GERD (gastroesophageal reflux disease)    • Headache    • Low back pain    • Polycythemia vera (HCC) 5/6/2022   • Stomach ulcer      Social History     Socioeconomic History   • Marital status:    Tobacco Use   • Smoking status: Current Every Day Smoker     Packs/day: 1.00   • Smokeless tobacco: Never Used   Substance and Sexual Activity   • Alcohol use: Yes     Alcohol/week: 12.0 standard drinks     Types: 12 Cans of beer per week   • Drug use: No   • Sexual activity: Defer     Family History   Problem Relation Age of Onset   • Arthritis Mother    • Asthma Mother    • Diabetes Mother    • Hypertension Mother    • Alcohol abuse Father    • Hypertension Father    • Anxiety disorder Sister    • Arthritis Sister    • Asthma Sister    • Mental illness Sister    • Alcohol abuse Brother    • Hypertension Brother    • Stroke Brother    • Stroke Maternal Grandmother          Review of Systems:  Constitutional: negative  Eyes: negative  Ears, nose, mouth, throat, and face: negative  Respiratory: negative  Cardiovascular: negative  Gastrointestinal: negative  Genitourinary:negative  Integument/breast: negative  Hematologic/lymphatic: negative  Musculoskeletal:negative  Neurological: negative  Behavioral/Psych: negative  Endocrine: negative  Allergic/Immunologic: negative Patient advised to discuss any positive ROS with PCP.      [place] - 20      Vitals:    09/20/22 1500   BP: 149/89   Pulse: 120   SpO2: 95%           09/20/22  1500   Weight: 98.7 kg (217 lb 9.6 oz)       Body mass index is 30.36 kg/m². BMI is >= 30 and <35. (Class 1 Obesity). The following options were offered after discussion;: nutrition counseling/recommendations      Neck circumference: 18\"          General: Alert. Cooperative. Well " developed. No acute distress.             Head:  Normocephalic. Symmetrical. Atraumatic.              Eyes: Sclera clear. No icterus. Normal EOM.             Ears: No deformities. Normal hearing.             Nose: No septal deviation. No drainage.          Throat: No oral lesions. No thrush. Moist mucous membranes. Trachea midline    Tongue is normal    Dentition is upper and lower dentures       Pharynx: Posterior pharyngeal pillars are narrow    Mallampati score of III (soft and hard palate and base of uvula visible)    Pharynx is nonerythematous   Chest Wall:  Normal shape. Symmetric expansion with respiration. No tenderness.          Lungs:  Clear to auscultation bilaterally. No wheezes. No rhonchi. No rales. Respirations regular, even and unlabored.            Heart:  Regular rhythm and normal rate. Normal S1 and S2. No murmur.  Extremities:  Moves all extremities well. No edema.               Skin: Dry. Intact. No bleeding. No rash.           Neuro: Moves all 4 extremities and cranial nerves grossly intact.  Psychiatric: Normal mood and affect.      Current Outpatient Medications:   •  aspirin (aspirin) 81 MG EC tablet, Take 1 tablet by mouth Daily. As secondary prevention of another heart attack and due to polycythemia vera., Disp: 90 tablet, Rfl: 3  •  atorvastatin (LIPITOR) 10 MG tablet, Take 1 tablet by mouth Daily. For cholesterol, Disp: 30 tablet, Rfl: 5  •  cholecalciferol (VITAMIN D3) 1.25 MG (42712 UT) capsule, Take 1 capsule by mouth Every 7 (Seven) Days. Low vitamin D level, Disp: 12 capsule, Rfl: 1  •  DULoxetine (Cymbalta) 30 MG capsule, Take 1 capsule by mouth 2 (Two) Times a Day. For depression, anxiety and back pain., Disp: 60 capsule, Rfl: 5  •  gabapentin (NEURONTIN) 600 MG tablet, Take 1 tablet by mouth 3 (Three) Times a Day. For low back pain with bilateral sciatica, Disp: 90 tablet, Rfl: 0  •  lamoTRIgine (LaMICtal) 25 MG tablet, Take 25 mg by mouth Every Morning., Disp: , Rfl:   •   metoprolol succinate XL (TOPROL-XL) 25 MG 24 hr tablet, Take 1 tablet by mouth Daily., Disp: 30 tablet, Rfl: 5  •  pantoprazole (PROTONIX) 40 MG EC tablet, Take 1 tablet by mouth Daily., Disp: 30 tablet, Rfl: 5  •  vitamin B-12 (CYANOCOBALAMIN) 250 MCG tablet, Take 1 tablet by mouth Daily. For B12 deficiency, Disp: 30 tablet, Rfl: 5  •  ziprasidone (GEODON) 40 MG capsule, Take 40 mg by mouth 2 (Two) Times a Day., Disp: , Rfl:     Lab Results   Component Value Date    WBC 8.28 07/26/2022    HGB 15.3 07/26/2022    HCT 46.8 07/26/2022    MCV 92.1 07/26/2022     07/26/2022     Lab Results   Component Value Date    GLUCOSE 140 (H) 07/26/2022    CALCIUM 9.1 07/26/2022     07/26/2022    K 4.0 07/26/2022    CO2 32.0 (H) 07/26/2022     07/26/2022    BUN 10 07/26/2022    CREATININE 0.88 07/26/2022    EGFRIFNONA 100 12/15/2020    BCR 11.4 07/26/2022    ANIONGAP 7.0 07/26/2022     Lab Results   Component Value Date    INR 1.06 07/26/2022    PROTIME 13.6 07/26/2022     Lab Results   Component Value Date    CKTOTAL 59 07/26/2022    CKMB 581 (H) 05/18/2022    CKMBINDEX 0.72 05/13/2022    TROPONINI 0.04 05/14/2022       No results found for: PHART, UDC7FDR, PO2ART]    Contraindications to home sleep test: Sleep related movement disorder like periodic limb movement disorder    Assessment and Plan:    1. Excessive daytime sleepiness-   1. Check PSG  2. Good sleep hygiene   3. Drowsy driving tips- do not drive if feeling sleepy   4. RTC in 2 weeks with study results   2.   Snoring- New to me, additional work-up planned    1.  As above   3.   Restless leg syndrome/PLMD   1.  PSG  2.  Address again at follow-up   4.   Insomnia - sleep onset and or maintenance    1.  Good sleep hygiene   5.   Witnessed episode of apnea       I obtained a brief history from the patient, reviewed the medical problems and current medications, and made medical decisions regarding treatment based on that information.   I spent 25 minutes  caring for Serafin on this date of service. This time includes time spent by me in the following activities: preparing for the visit, obtaining and/or reviewing a separately obtained history, performing a medically appropriate examination and/or evaluation, counseling and educating the patient/family/caregiver, ordering medications, tests, or procedures and documenting information in the medical record. I answered all of his questions and he verbalized understanding. Discussion involved sleep apnea, health impact of sleep apnea, good sleep hygiene, PSG and follow-up.           RTC 2 weeks after study for results.             This document has been electronically signed by IGNACIA Barry on September 20, 2022 15:11 CDT          This document has been electronically signed by IGNACIA Barry on September 20, 2022         CC: Karolyn Chew APRN          No ref. provider found

## 2022-09-23 ENCOUNTER — LAB (OUTPATIENT)
Dept: LAB | Facility: OTHER | Age: 57
End: 2022-09-23

## 2022-09-23 ENCOUNTER — OFFICE VISIT (OUTPATIENT)
Dept: FAMILY MEDICINE CLINIC | Facility: CLINIC | Age: 57
End: 2022-09-23

## 2022-09-23 VITALS
HEIGHT: 71 IN | BODY MASS INDEX: 30.24 KG/M2 | DIASTOLIC BLOOD PRESSURE: 88 MMHG | SYSTOLIC BLOOD PRESSURE: 126 MMHG | TEMPERATURE: 95.8 F | WEIGHT: 216 LBS | HEART RATE: 70 BPM | OXYGEN SATURATION: 99 %

## 2022-09-23 DIAGNOSIS — R20.0 EXTREMITY NUMBNESS: ICD-10-CM

## 2022-09-23 DIAGNOSIS — M54.41 CHRONIC MIDLINE LOW BACK PAIN WITH BILATERAL SCIATICA: ICD-10-CM

## 2022-09-23 DIAGNOSIS — R20.0 EXTREMITY NUMBNESS: Primary | ICD-10-CM

## 2022-09-23 DIAGNOSIS — M54.42 CHRONIC MIDLINE LOW BACK PAIN WITH BILATERAL SCIATICA: ICD-10-CM

## 2022-09-23 DIAGNOSIS — G89.29 CHRONIC MIDLINE LOW BACK PAIN WITH BILATERAL SCIATICA: ICD-10-CM

## 2022-09-23 PROCEDURE — 82607 VITAMIN B-12: CPT | Performed by: NURSE PRACTITIONER

## 2022-09-23 PROCEDURE — 99213 OFFICE O/P EST LOW 20 MIN: CPT | Performed by: NURSE PRACTITIONER

## 2022-09-23 NOTE — PROGRESS NOTES
Subjective   Serafin Nash is a 57 y.o. male who presents to the office to establish care which will include management of extremity numbness and chronic midline low back pain.  Says that he was on Gabapentin and had back surgery on 8/4 for laminectomy by Nuha.  Continues to suffer from bilateral upper extremity tingling and numbness, goes for NCT on 12/6 which was ordered by anusha Joya.  Will also be going for sleep study in October.  Had a cervical spine XR on 4/22 that showed postop and degenerative changes.    History of Present Illness     The following portions of the patient's history were reviewed and updated as appropriate: allergies, current medications, past family history, past medical history, past social history, past surgical history and problem list.    Review of Systems   Constitutional: Negative for chills, fatigue and fever.   HENT: Negative for congestion, sneezing, sore throat and trouble swallowing.    Eyes: Negative for visual disturbance.   Respiratory: Negative for cough, chest tightness, shortness of breath and wheezing.    Cardiovascular: Negative for chest pain, palpitations and leg swelling.   Gastrointestinal: Negative for abdominal pain, constipation, diarrhea, nausea and vomiting.   Genitourinary: Negative for dysuria, frequency and urgency.   Musculoskeletal: Negative for neck pain.   Skin: Negative for rash.   Neurological: Positive for numbness. Negative for dizziness, weakness and headaches.   Psychiatric/Behavioral:        In the past two weeks the pt has not felt down, depressed, hopeless or lost interest in doing things   All other systems reviewed and are negative.      Objective   Physical Exam  Vitals and nursing note reviewed.   Constitutional:       General: He is not in acute distress.     Appearance: He is well-developed. He is not ill-appearing.   HENT:      Head: Normocephalic and atraumatic.      Right Ear: External ear normal.      Left Ear: External ear  normal.      Nose: Nose normal.   Eyes:      General: No scleral icterus.        Right eye: No discharge.         Left eye: No discharge.      Conjunctiva/sclera: Conjunctivae normal.      Pupils: Pupils are equal, round, and reactive to light.   Neck:      Thyroid: No thyromegaly.   Cardiovascular:      Rate and Rhythm: Normal rate and regular rhythm.      Heart sounds: Normal heart sounds. No murmur heard.    No friction rub. No gallop.   Pulmonary:      Effort: Pulmonary effort is normal. No respiratory distress.      Breath sounds: Normal breath sounds. No wheezing or rales.   Abdominal:      General: Bowel sounds are normal. There is no distension.      Palpations: Abdomen is soft.      Tenderness: There is no abdominal tenderness. There is no guarding or rebound.   Musculoskeletal:         General: Normal range of motion.      Cervical back: Normal range of motion and neck supple.   Lymphadenopathy:      Cervical: No cervical adenopathy.   Skin:     General: Skin is warm and dry.      Findings: No rash.   Neurological:      Mental Status: He is alert and oriented to person, place, and time.      GCS: GCS eye subscore is 4. GCS verbal subscore is 5. GCS motor subscore is 6.      Cranial Nerves: Cranial nerves are intact. No cranial nerve deficit.      Sensory: Sensation is intact.      Motor: Motor function is intact.      Coordination: Coordination is intact.      Gait: Gait is intact.      Comments: NEG Tinel's   Psychiatric:         Attention and Perception: Attention and perception normal.         Mood and Affect: Mood is anxious.         Speech: Speech normal.         Behavior: Behavior normal. Behavior is cooperative.         Thought Content: Thought content normal.         Cognition and Memory: Cognition and memory normal.         Judgment: Judgment normal.         Assessment & Plan   Diagnoses and all orders for this visit:    1. Extremity numbness (Primary)  -     Vitamin B12; Future  -     MRI  Cervical Spine Without Contrast; Future    2. Chronic midline low back pain with bilateral sciatica    Will need to fully investigate issue of extremity numbness and tingling so will get B12 level and send for MRI c spine.  Will not be prescribing medications today.    Encouraged slow position changes.  Reviewed JOE# [id number].             This document has been electronically signed by IGNACIA Bustamante on September 24, 2022 09:41 CDT

## 2022-09-24 LAB — VIT B12 BLD-MCNC: 284 PG/ML (ref 211–946)

## 2022-11-09 ENCOUNTER — TRANSCRIBE ORDERS (OUTPATIENT)
Dept: PODIATRY | Facility: CLINIC | Age: 57
End: 2022-11-09

## 2022-11-09 DIAGNOSIS — S92.411A DISPLACED FRACTURE OF PROXIMAL PHALANX OF RIGHT GREAT TOE, INITIAL ENCOUNTER FOR CLOSED FRACTURE: Primary | ICD-10-CM

## 2022-11-09 DIAGNOSIS — M79.674 PAIN IN RIGHT TOE(S): ICD-10-CM

## 2022-11-14 ENCOUNTER — HOSPITAL ENCOUNTER (OUTPATIENT)
Dept: SLEEP MEDICINE | Facility: HOSPITAL | Age: 57
Discharge: HOME | End: 2022-11-14
Admitting: NURSE PRACTITIONER

## 2022-11-14 DIAGNOSIS — G47.61 PLMD (PERIODIC LIMB MOVEMENT DISORDER): ICD-10-CM

## 2022-11-14 DIAGNOSIS — R06.81 WITNESSED EPISODE OF APNEA: ICD-10-CM

## 2022-11-14 DIAGNOSIS — G47.19 EXCESSIVE DAYTIME SLEEPINESS: ICD-10-CM

## 2022-11-14 DIAGNOSIS — F51.04 PSYCHOPHYSIOLOGICAL INSOMNIA: ICD-10-CM

## 2022-11-14 DIAGNOSIS — R06.83 SNORING: ICD-10-CM

## 2022-11-14 PROCEDURE — 95810 POLYSOM 6/> YRS 4/> PARAM: CPT | Performed by: PSYCHIATRY & NEUROLOGY

## 2022-11-14 PROCEDURE — 95810 POLYSOM 6/> YRS 4/> PARAM: CPT

## 2022-11-29 ENCOUNTER — OFFICE VISIT (OUTPATIENT)
Dept: SLEEP MEDICINE | Facility: HOSPITAL | Age: 57
End: 2022-11-29

## 2022-11-29 VITALS
WEIGHT: 222.5 LBS | HEART RATE: 67 BPM | DIASTOLIC BLOOD PRESSURE: 75 MMHG | HEIGHT: 71 IN | BODY MASS INDEX: 31.15 KG/M2 | OXYGEN SATURATION: 99 % | SYSTOLIC BLOOD PRESSURE: 131 MMHG

## 2022-11-29 DIAGNOSIS — G25.81 RESTLESS LEGS SYNDROME (RLS): Primary | ICD-10-CM

## 2022-11-29 DIAGNOSIS — F45.8 OTHER SOMATOFORM DISORDERS: ICD-10-CM

## 2022-11-29 DIAGNOSIS — G47.33 OSA (OBSTRUCTIVE SLEEP APNEA): ICD-10-CM

## 2022-11-29 PROCEDURE — 99214 OFFICE O/P EST MOD 30 MIN: CPT | Performed by: NURSE PRACTITIONER

## 2022-11-29 NOTE — PROGRESS NOTES
Sleep Clinic Follow-Up    CHIEF COMPLAINT: follow up sleep testing    LAST OV: 09/20/2022 (I reviewed this note)    HPI:  The patient is a 57 y.o. male.  I discussed the results of the recent diagnostic polysomnography performed on 11/14/2022.  Sleep efficiency of 75%. REM and NREM sleep. Overall AHI of 13.7. REM AHI of 21. Average O2 of 93% with a bushra of 84%. No sustained hypoxia. Average pulse is 62 BPM. PLMS of 74. Snoring present.       INTERVAL MEDICAL HISTORY: No change since last office visit in regard to the patient's bedtime routine, medications, or diagnosis.    PAP Data:  Currently not on therapy   Mask type: mask fitting per JAREN  DME: Ag        MEDICATIONS:   Current Outpatient Medications:   •  aspirin (aspirin) 81 MG EC tablet, Take 1 tablet by mouth Daily. As secondary prevention of another heart attack and due to polycythemia vera., Disp: 90 tablet, Rfl: 3  •  atorvastatin (LIPITOR) 10 MG tablet, Take 1 tablet by mouth Daily. For cholesterol, Disp: 30 tablet, Rfl: 5  •  cholecalciferol (VITAMIN D3) 1.25 MG (91455 UT) capsule, Take 1 capsule by mouth Every 7 (Seven) Days. Low vitamin D level, Disp: 12 capsule, Rfl: 1  •  DULoxetine (Cymbalta) 30 MG capsule, Take 1 capsule by mouth 2 (Two) Times a Day. For depression, anxiety and back pain. (Patient taking differently: Take 60 mg by mouth 2 (Two) Times a Day. For depression, anxiety and back pain.), Disp: 60 capsule, Rfl: 5  •  gabapentin (NEURONTIN) 600 MG tablet, Take 1 tablet by mouth 3 (Three) Times a Day. For low back pain with bilateral sciatica, Disp: 90 tablet, Rfl: 0  •  lamoTRIgine (LaMICtal) 25 MG tablet, Take 100 mg by mouth Every Morning., Disp: , Rfl:   •  metoprolol succinate XL (TOPROL-XL) 25 MG 24 hr tablet, Take 1 tablet by mouth Daily., Disp: 30 tablet, Rfl: 5  •  pantoprazole (PROTONIX) 40 MG EC tablet, Take 1 tablet by mouth Daily., Disp: 30 tablet, Rfl: 5  •  ziprasidone (GEODON) 40 MG capsule, Take 80 mg by mouth 2 (Two)  Times a Day., Disp: , Rfl:     PHYSICAL EXAM  Vitals:    11/29/22 1259   BP: 131/75   Pulse: 67   SpO2: 99%           11/29/22  1259   Weight: 101 kg (222 lb 8 oz)       Body mass index is 31.05 kg/m². BMI is >= 30 and <35. (Class 1 Obesity). The following options were offered after discussion;: nutrition counseling/recommendations      Gen:  No acute distress heard in voice, alert, oriented  Eyes:    Moist conjunctiva, EOMI   Lungs:  Normal effort, non-labored breathing  Heart:  No lower extremity edema   Psych:  Appropriate affect   Neuro:  Cn2-12 appear intact     Assessment and Plan:    1. Obstructive sleep apnea -uncontrolled   1. Start on APAP 5-15 cm H2O with mask fitting per DME and PAP supplies   2. Monitor compliance report   3. Drowsy driving tips- do not drive if feeling sleepy   4. Return to clinic in 31-90 days with compliance report, or sooner if needed   2. Restless leg syndrome/PLMD , uncontrolled   1. Iron studies - call with results   2. PAP therapy   3. Continue to monitor   3. Insomnia - sleep onset and or maintenance   1. Good sleep hygiene       The sleep results were discussed in detail with the patient.  The risks of untreated sleep apnea were reviewed.  Treatment options for sleep apnea were discussed in detail. He is willing to pursue PAP therapy.  I counseled patient on PAP management, maintenance, compliance, sleep hygiene, including regular sleep wake schedule and stimulus control therapy, the importance of weight reduction, safe driving and abstaining from smoking and alcohol consumption, as well as other sedatives.       All of the patient's questions were answered. He states understanding and agreement with my assessment and plan as above.     I obtained a brief history from the patient, reviewed the medical problems and current medications, and made medical decisions regarding treatment based on that information. Total visit time 20 min, with total of 13 minutes spent counseling  patient regarding: PAP therapy, PAP compliance, PAP maintenance, Lifestyle modifications, Sleep hygiene, Lab testing and Study results.       Patient to follow up in 31-90 days with compliance report   He agrees to return sooner on a prn basis if sx change.           This document has been electronically signed by IGNACIA Barry on November 29, 2022 13:20 CST            CC: Karolyn Chew APRN          No ref. provider found

## 2023-01-03 ENCOUNTER — OFFICE VISIT (OUTPATIENT)
Dept: FAMILY MEDICINE CLINIC | Facility: CLINIC | Age: 58
End: 2023-01-03
Payer: COMMERCIAL

## 2023-01-03 ENCOUNTER — LAB (OUTPATIENT)
Dept: LAB | Facility: OTHER | Age: 58
End: 2023-01-03
Payer: COMMERCIAL

## 2023-01-03 VITALS
HEART RATE: 61 BPM | DIASTOLIC BLOOD PRESSURE: 76 MMHG | WEIGHT: 234 LBS | BODY MASS INDEX: 32.76 KG/M2 | TEMPERATURE: 97.6 F | OXYGEN SATURATION: 98 % | HEIGHT: 71 IN | SYSTOLIC BLOOD PRESSURE: 108 MMHG

## 2023-01-03 DIAGNOSIS — I10 BENIGN ESSENTIAL HTN: ICD-10-CM

## 2023-01-03 DIAGNOSIS — I10 BENIGN ESSENTIAL HTN: Chronic | ICD-10-CM

## 2023-01-03 DIAGNOSIS — G89.29 CHRONIC MIDLINE LOW BACK PAIN, UNSPECIFIED WHETHER SCIATICA PRESENT: ICD-10-CM

## 2023-01-03 DIAGNOSIS — G25.81 RESTLESS LEGS SYNDROME (RLS): ICD-10-CM

## 2023-01-03 DIAGNOSIS — M54.2 CHRONIC MIDLINE POSTERIOR NECK PAIN: Chronic | ICD-10-CM

## 2023-01-03 DIAGNOSIS — Z12.5 PROSTATE CANCER SCREENING: ICD-10-CM

## 2023-01-03 DIAGNOSIS — M54.50 CHRONIC MIDLINE LOW BACK PAIN, UNSPECIFIED WHETHER SCIATICA PRESENT: ICD-10-CM

## 2023-01-03 DIAGNOSIS — Z51.81 MEDICATION MONITORING ENCOUNTER: ICD-10-CM

## 2023-01-03 DIAGNOSIS — Z51.81 MEDICATION MONITORING ENCOUNTER: Primary | ICD-10-CM

## 2023-01-03 DIAGNOSIS — F45.8 OTHER SOMATOFORM DISORDERS: ICD-10-CM

## 2023-01-03 DIAGNOSIS — G89.29 CHRONIC MIDLINE POSTERIOR NECK PAIN: Chronic | ICD-10-CM

## 2023-01-03 LAB
ALBUMIN SERPL-MCNC: 3.6 G/DL (ref 3.5–5)
ALBUMIN/GLOB SERPL: 1.2 G/DL (ref 1.1–1.8)
ALP SERPL-CCNC: 111 U/L (ref 38–126)
ALT SERPL W P-5'-P-CCNC: 47 U/L
ANION GAP SERPL CALCULATED.3IONS-SCNC: 7 MMOL/L (ref 5–15)
AST SERPL-CCNC: 28 U/L (ref 17–59)
BASOPHILS # BLD AUTO: 0.06 10*3/MM3 (ref 0–0.2)
BASOPHILS NFR BLD AUTO: 0.8 % (ref 0–1.5)
BILIRUB SERPL-MCNC: 0.6 MG/DL (ref 0.2–1.3)
BUN SERPL-MCNC: 11 MG/DL (ref 7–23)
BUN/CREAT SERPL: 13.1 (ref 7–25)
CALCIUM SPEC-SCNC: 8.5 MG/DL (ref 8.4–10.2)
CHLORIDE SERPL-SCNC: 103 MMOL/L (ref 101–112)
CO2 SERPL-SCNC: 28 MMOL/L (ref 22–30)
CREAT SERPL-MCNC: 0.84 MG/DL (ref 0.7–1.3)
DEPRECATED RDW RBC AUTO: 44.7 FL (ref 37–54)
EGFRCR SERPLBLD CKD-EPI 2021: 101.1 ML/MIN/1.73
EOSINOPHIL # BLD AUTO: 0.32 10*3/MM3 (ref 0–0.4)
EOSINOPHIL NFR BLD AUTO: 4.2 % (ref 0.3–6.2)
ERYTHROCYTE [DISTWIDTH] IN BLOOD BY AUTOMATED COUNT: 13.6 % (ref 12.3–15.4)
FERRITIN SERPL-MCNC: 336 NG/ML (ref 30–400)
GLOBULIN UR ELPH-MCNC: 3 GM/DL (ref 2.3–3.5)
GLUCOSE SERPL-MCNC: 136 MG/DL (ref 70–99)
HCT VFR BLD AUTO: 42.9 % (ref 37.5–51)
HGB BLD-MCNC: 14.3 G/DL (ref 13–17.7)
IRON 24H UR-MRATE: 97 MCG/DL (ref 59–158)
IRON SATN MFR SERPL: 26 % (ref 20–50)
LYMPHOCYTES # BLD AUTO: 2.78 10*3/MM3 (ref 0.7–3.1)
LYMPHOCYTES NFR BLD AUTO: 36.3 % (ref 19.6–45.3)
MCH RBC QN AUTO: 30.4 PG (ref 26.6–33)
MCHC RBC AUTO-ENTMCNC: 33.3 G/DL (ref 31.5–35.7)
MCV RBC AUTO: 91.3 FL (ref 79–97)
MONOCYTES # BLD AUTO: 0.86 10*3/MM3 (ref 0.1–0.9)
MONOCYTES NFR BLD AUTO: 11.2 % (ref 5–12)
NEUTROPHILS NFR BLD AUTO: 3.63 10*3/MM3 (ref 1.7–7)
NEUTROPHILS NFR BLD AUTO: 47.5 % (ref 42.7–76)
PLATELET # BLD AUTO: 236 10*3/MM3 (ref 140–450)
PMV BLD AUTO: 10.7 FL (ref 6–12)
POTASSIUM SERPL-SCNC: 3.8 MMOL/L (ref 3.4–5)
PROT SERPL-MCNC: 6.6 G/DL (ref 6.3–8.6)
PSA SERPL-MCNC: 0.97 NG/ML (ref 0–4)
RBC # BLD AUTO: 4.7 10*6/MM3 (ref 4.14–5.8)
SODIUM SERPL-SCNC: 138 MMOL/L (ref 137–145)
T4 FREE SERPL-MCNC: 0.87 NG/DL (ref 0.93–1.7)
TIBC SERPL-MCNC: 378 MCG/DL (ref 298–536)
TRANSFERRIN SERPL-MCNC: 254 MG/DL (ref 200–360)
TSH SERPL DL<=0.05 MIU/L-ACNC: 1.72 UIU/ML (ref 0.27–4.2)
WBC NRBC COR # BLD: 7.65 10*3/MM3 (ref 3.4–10.8)

## 2023-01-03 PROCEDURE — 84439 ASSAY OF FREE THYROXINE: CPT | Performed by: NURSE PRACTITIONER

## 2023-01-03 PROCEDURE — 84466 ASSAY OF TRANSFERRIN: CPT | Performed by: NURSE PRACTITIONER

## 2023-01-03 PROCEDURE — 80307 DRUG TEST PRSMV CHEM ANLYZR: CPT | Performed by: NURSE PRACTITIONER

## 2023-01-03 PROCEDURE — 82728 ASSAY OF FERRITIN: CPT | Performed by: NURSE PRACTITIONER

## 2023-01-03 PROCEDURE — G0481 DRUG TEST DEF 8-14 CLASSES: HCPCS | Performed by: NURSE PRACTITIONER

## 2023-01-03 PROCEDURE — 3074F SYST BP LT 130 MM HG: CPT | Performed by: NURSE PRACTITIONER

## 2023-01-03 PROCEDURE — 1160F RVW MEDS BY RX/DR IN RCRD: CPT | Performed by: NURSE PRACTITIONER

## 2023-01-03 PROCEDURE — 83540 ASSAY OF IRON: CPT | Performed by: NURSE PRACTITIONER

## 2023-01-03 PROCEDURE — 3078F DIAST BP <80 MM HG: CPT | Performed by: NURSE PRACTITIONER

## 2023-01-03 PROCEDURE — 1159F MED LIST DOCD IN RCRD: CPT | Performed by: NURSE PRACTITIONER

## 2023-01-03 PROCEDURE — G0103 PSA SCREENING: HCPCS | Performed by: NURSE PRACTITIONER

## 2023-01-03 PROCEDURE — 80050 GENERAL HEALTH PANEL: CPT | Performed by: NURSE PRACTITIONER

## 2023-01-03 PROCEDURE — 99214 OFFICE O/P EST MOD 30 MIN: CPT | Performed by: NURSE PRACTITIONER

## 2023-01-08 LAB
6MAM UR QL CFM: NEGATIVE
6MAM/CREAT UR: NOT DETECTED NG/MG CREAT
7AMINOCLONAZEPAM/CREAT UR: NOT DETECTED NG/MG CREAT
A-OH ALPRAZ/CREAT UR: NOT DETECTED NG/MG CREAT
A-OH-TRIAZOLAM/CREAT UR CFM: NOT DETECTED NG/MG CREAT
ALFENTANIL/CREAT UR CFM: NOT DETECTED NG/MG CREAT
ALPHA-HYDROXYMIDAZOLAM, URINE: NOT DETECTED NG/MG CREAT
ALPRAZ/CREAT UR CFM: NOT DETECTED NG/MG CREAT
AMOBARBITAL UR QL CFM: NOT DETECTED
AMPHET/CREAT UR: 731 NG/MG CREAT
AMPHETAMINES UR QL CFM: NORMAL
BARBITAL UR QL CFM: NOT DETECTED
BARBITURATES UR QL CFM: NEGATIVE
BENZODIAZ UR QL CFM: NEGATIVE
BUPRENORPHINE UR QL CFM: NEGATIVE
BUPRENORPHINE/CREAT UR: NOT DETECTED NG/MG CREAT
BUTABARBITAL UR QL CFM: NOT DETECTED
BUTALBITAL UR QL CFM: NOT DETECTED
BZE/CREAT UR: NOT DETECTED NG/MG CREAT
CANNABINOIDS UR QL CFM: NORMAL
CARBOXYTHC/CREAT UR: 278 NG/MG CREAT
CLONAZEPAM/CREAT UR CFM: NOT DETECTED NG/MG CREAT
COCAETHYLENE/CREAT UR CFM: NOT DETECTED NG/MG CREAT
COCAINE UR QL CFM: NEGATIVE
COCAINE/CREAT UR CFM: NOT DETECTED NG/MG CREAT
CODEINE/CREAT UR: NOT DETECTED NG/MG CREAT
CREAT UR-MCNC: 190 MG/DL
DESALKYLFLURAZ/CREAT UR: NOT DETECTED NG/MG CREAT
DESMETHYLFLUNITRAZEPAM: NOT DETECTED NG/MG CREAT
DHC/CREAT UR: NOT DETECTED NG/MG CREAT
DIAZEPAM/CREAT UR: NOT DETECTED NG/MG CREAT
DRUGS UR: NORMAL
EDDP/CREAT UR: NOT DETECTED NG/MG CREAT
ETHANOL UR CFM-MCNC: NOT DETECTED G/DL
ETHANOL UR QL CFM: NEGATIVE
FENTANYL UR QL CFM: NEGATIVE
FENTANYL/CREAT UR: NOT DETECTED NG/MG CREAT
FLUNITRAZEPAM UR QL CFM: NOT DETECTED NG/MG CREAT
HYDROCODONE/CREAT UR: NOT DETECTED NG/MG CREAT
HYDROMORPHONE/CREAT UR: NOT DETECTED NG/MG CREAT
LORAZEPAM/CREAT UR: NOT DETECTED NG/MG CREAT
MDA/CREAT UR: NOT DETECTED NG/MG CREAT
MDMA/CREAT UR: NOT DETECTED NG/MG CREAT
MEPHOBARBITAL UR QL CFM: NOT DETECTED
METHADONE UR QL CFM: NEGATIVE
METHADONE/CREAT UR: NOT DETECTED NG/MG CREAT
METHAMPHET/CREAT UR: >2632 NG/MG CREAT
MIDAZOLAM/CREAT UR CFM: NOT DETECTED NG/MG CREAT
MORPHINE/CREAT UR: NOT DETECTED NG/MG CREAT
N-NORTRAMADOL/CREAT UR CFM: NOT DETECTED NG/MG CREAT
NARCOTICS UR: NEGATIVE
NORBUPRENORPHINE/CREAT UR: NOT DETECTED NG/MG CREAT
NORCODEINE/CREAT UR CFM: NOT DETECTED NG/MG CREAT
NORDIAZEPAM/CREAT UR: NOT DETECTED NG/MG CREAT
NORFENTANYL/CREAT UR: NOT DETECTED NG/MG CREAT
NORHYDROCODONE/CREAT UR: NOT DETECTED NG/MG CREAT
NORMORPHINE UR-MCNC: NOT DETECTED NG/MG CREAT
NOROXYCODONE/CREAT UR: NOT DETECTED NG/MG CREAT
NOROXYMORPHONE/CREAT UR CFM: NOT DETECTED NG/MG CREAT
O-NORTRAMADOL UR CFM-MCNC: NOT DETECTED NG/MG CREAT
OPIATES UR QL CFM: NEGATIVE
OXAZEPAM/CREAT UR: NOT DETECTED NG/MG CREAT
OXYCODONE UR QL CFM: NEGATIVE
OXYCODONE/CREAT UR: NOT DETECTED NG/MG CREAT
OXYMORPHONE/CREAT UR: NOT DETECTED NG/MG CREAT
PENTOBARB UR QL CFM: NOT DETECTED
PHENOBARB UR QL CFM: NOT DETECTED
SECOBARBITAL UR QL CFM: NOT DETECTED
SERVICE CMNT 02-IMP: NORMAL
SUFENTANIL/CREAT UR CFM: NOT DETECTED NG/MG CREAT
TAPENTADOL UR QL CFM: NEGATIVE
TAPENTADOL/CREAT UR: NOT DETECTED NG/MG CREAT
TEMAZEPAM/CREAT UR: NOT DETECTED NG/MG CREAT
THIOPENTAL UR QL CFM: NOT DETECTED
TRAMADOL UR QL CFM: NOT DETECTED NG/MG CREAT

## 2023-01-17 ENCOUNTER — OFFICE VISIT (OUTPATIENT)
Dept: SLEEP MEDICINE | Facility: HOSPITAL | Age: 58
End: 2023-01-17
Payer: COMMERCIAL

## 2023-01-17 VITALS
WEIGHT: 230 LBS | HEART RATE: 68 BPM | SYSTOLIC BLOOD PRESSURE: 130 MMHG | HEIGHT: 71 IN | DIASTOLIC BLOOD PRESSURE: 76 MMHG | BODY MASS INDEX: 32.2 KG/M2 | OXYGEN SATURATION: 96 %

## 2023-01-17 DIAGNOSIS — G47.33 OBSTRUCTIVE SLEEP APNEA: Primary | ICD-10-CM

## 2023-01-17 PROCEDURE — 99213 OFFICE O/P EST LOW 20 MIN: CPT | Performed by: NURSE PRACTITIONER

## 2023-01-17 RX ORDER — DULOXETIN HYDROCHLORIDE 60 MG/1
60 CAPSULE, DELAYED RELEASE ORAL 2 TIMES DAILY
COMMUNITY
Start: 2023-01-05

## 2023-01-17 NOTE — PROGRESS NOTES
Sleep Clinic Follow Up    Date: 2023  Primary Care Provider: Karolyn Chew APRN    Last office visit: 2022 (I reviewed this note)    CC: Follow up: BLAYNE started on CPAP      Interim History:  Since the last visit:    1) mild BLAYNE -  Serafin Nash has mostly remained compliant with CPAP. He denies mask and machine issues, dry mouth, headaches, or pressures intolerance. He denies abnormal dreams, sleep paralysis, nasal congestion, URI sx. Since starting PAP therapy, patient reports slightly less daytime sleepiness and more refreshing sleep. All of his data is not transmitting or saving to his SD card - there is no recorded usage since 22. He is going to take his machine and card to his DME provider.  He is still having occasional sleepiness with driving.     2) Patient reports decreased RLS symptoms since starting PAP therapy.    3) Daytime sleepiness - Patient reports some improvement. He is still only getting ~4-6 hours of sleep most nights and takes his mask off throughout the night without realizing it sometimes.     Sleep Testin. PSG on 2022, AHI of 13.7, PLMI of 74  2. Currently on 5-15 cm H2O    PAP Data:    Time frame: 2022-2023   Compliance: 38.9%  Average use on days used: 6 hrs 38 min  Percent of days with usage greater than or equal to 4 hours: 30.6%  PAP range: 5-15 cm H2O  Average 90% pressure: 7.6 cmH2O  Leak: 0 minutes  Average AHI: 2.8 events/hr  Mask type: Nasal cushion   DME: [facility]  Machine type: 3B [employer] II    Bed time:   Sleep latency: 20 minutes  Number of times awakens during the night: 2-3  Wake time: 0545  Estimated total sleep time at night: 4-6 hours  Caffeine intake: 12 cups of coffee, 0 cups of tea, and 6 sodas per day  Alcohol intake: 0 drinks per week  Nap time: occasional   Sleepiness with Driving: most days     [place] - 15 (previously 20)  [place] Sleepiness Scale  Sitting and reading: Moderate chance of  dozing  Watching TV: High chance of dozing  Sitting, inactive in a public place (e.g. a theatre or a meeting): High chance of dozing  As a passenger in a car for an hour without a break: High chance of dozing  Lying down to rest in the afternoon when circumstances permit: High chance of dozing  Sitting and talking to someone: Would never doze  Sitting quietly after a lunch without alcohol: Slight chance of dozing  In a car, while stopped for a few minutes in traffic: Would never doze  Total score: 15    PMHx, FH, SH reviewed and pertinent changes are: Stopped taking gabapentin.      Review of Systems:   Negative for chest pain, SOA, fever, chills, cough, N/V/D, abdominal pain.    Smokin ppd    Serafin Nash  reports that he has been smoking cigarettes. He has a 50.00 pack-year smoking history. He has never used smokeless tobacco.. I have educated him on the risk of diseases from using tobacco products such as cancer, COPD and heart disease.     I advised him to quit and he is not willing to quit.    I spent 3.5 minutes counseling the patient.           Physical Exam:  Vitals:    23 1431   BP: 130/76   Pulse: 68   SpO2: 96%           23  1431   Weight: 104 kg (230 lb)     Body mass index is 32.09 kg/m². BMI is >= 30 and <35. (Class 1 Obesity). The following options were offered after discussion;: referral to primary care    Gen:                No distress, conversant, pleasant, appears stated age, alert, oriented  Eyes:               Anicteric sclera, moist conjunctiva, no lid lag                           PERRL, EOMI   Heent:             NC/AT                          Oropharynx clear                          Normal hearing  Lungs:             Normal effort, non-labored breathing      CV:                  Normal S1/S2                          No lower extremity edema  ABD:               Soft, rounded, non-distended                Psych:             Appropriate affect  Neuro:             CN  2-12 appear intact    Past Medical History:   Diagnosis Date   • Alcohol abuse    • Anxiety    • Arthritis    • Benign essential HTN 5/6/2022   • Chronic midline low back pain with bilateral sciatica 5/6/2022   • Depression    • GERD (gastroesophageal reflux disease)    • Headache    • Low back pain    • Polycythemia vera (HCC) 5/6/2022   • Stomach ulcer        Current Outpatient Medications:   •  aspirin (aspirin) 81 MG EC tablet, Take 1 tablet by mouth Daily. As secondary prevention of another heart attack and due to polycythemia vera., Disp: 90 tablet, Rfl: 3  •  atorvastatin (LIPITOR) 10 MG tablet, Take 1 tablet by mouth Daily. For cholesterol, Disp: 30 tablet, Rfl: 5  •  cholecalciferol (VITAMIN D3) 1.25 MG (45684 UT) capsule, Take 1 capsule by mouth Every 7 (Seven) Days. Low vitamin D level, Disp: 12 capsule, Rfl: 1  •  DULoxetine (CYMBALTA) 60 MG capsule, Take 60 mg by mouth 2 (Two) Times a Day., Disp: , Rfl:   •  gabapentin (NEURONTIN) 600 MG tablet, Take 1 tablet by mouth 3 (Three) Times a Day. For low back pain with bilateral sciatica, Disp: 90 tablet, Rfl: 0  •  lamoTRIgine (LaMICtal) 25 MG tablet, Take 100 mg by mouth Every Morning., Disp: , Rfl:   •  metoprolol succinate XL (TOPROL-XL) 25 MG 24 hr tablet, Take 1 tablet by mouth Daily., Disp: 30 tablet, Rfl: 5  •  pantoprazole (PROTONIX) 40 MG EC tablet, Take 1 tablet by mouth Daily., Disp: 30 tablet, Rfl: 5  •  ziprasidone (GEODON) 40 MG capsule, Take 80 mg by mouth 2 (Two) Times a Day., Disp: , Rfl:     WBC   Date Value Ref Range Status   01/03/2023 7.65 3.40 - 10.80 10*3/mm3 Final   10/30/2019 12.5 (H) 4.0 - 11.0 10*3/uL Final     RBC   Date Value Ref Range Status   01/03/2023 4.70 4.14 - 5.80 10*6/mm3 Final   10/30/2019 4.88 4.73 - 5.49 10*6/uL Final     Hemoglobin   Date Value Ref Range Status   01/03/2023 14.3 13.0 - 17.7 g/dL Final   10/30/2019 14.5 14.4 - 16.6 g/dL Final     Hematocrit   Date Value Ref Range Status   01/03/2023 42.9 37.5 - 51.0 %  Final   10/30/2019 44.2 42.9 - 49.1 % Final     MCV   Date Value Ref Range Status   01/03/2023 91.3 79.0 - 97.0 fL Final   10/30/2019 91 85 - 95 fL Final     MCH   Date Value Ref Range Status   01/03/2023 30.4 26.6 - 33.0 pg Final   10/30/2019 29.7 28.0 - 32.0 pg Final     MCHC   Date Value Ref Range Status   01/03/2023 33.3 31.5 - 35.7 g/dL Final   10/30/2019 32.8 32.0 - 34.0 g/dL Final     RDW   Date Value Ref Range Status   01/03/2023 13.6 12.3 - 15.4 % Final   10/30/2019 40.8 37 - 54 fL Final     RDW-SD   Date Value Ref Range Status   01/03/2023 44.7 37.0 - 54.0 fl Final     MPV   Date Value Ref Range Status   01/03/2023 10.7 6.0 - 12.0 fL Final   10/30/2019 10.5 8.0 - 12.0 fL Final     Platelets   Date Value Ref Range Status   01/03/2023 236 140 - 450 10*3/mm3 Final   10/30/2019 337 150 - 450 10*3/uL Final     Neutrophil %   Date Value Ref Range Status   01/03/2023 47.5 42.7 - 76.0 % Final     Lymphocyte Rel %   Date Value Ref Range Status   10/30/2019 16.3 5 - 55 % Final     Lymphocyte %   Date Value Ref Range Status   01/03/2023 36.3 19.6 - 45.3 % Final     Monocyte Rel %   Date Value Ref Range Status   10/30/2019 14 (H) 3 - 8 % Final     Monocyte %   Date Value Ref Range Status   01/03/2023 11.2 5.0 - 12.0 % Final     Eosinophil Rel %   Date Value Ref Range Status   10/30/2019 2 0 - 5 % Final     Eosinophil %   Date Value Ref Range Status   01/03/2023 4.2 0.3 - 6.2 % Final     Basophil Rel %   Date Value Ref Range Status   10/30/2019 0.2 0 - 2 % Final     Basophil %   Date Value Ref Range Status   01/03/2023 0.8 0.0 - 1.5 % Final     Immature Grans %   Date Value Ref Range Status   10/30/2019 66.8 45 - 80 % Final     Neutrophils, Absolute   Date Value Ref Range Status   01/03/2023 3.63 1.70 - 7.00 10*3/mm3 Final     Lymphocytes, Absolute   Date Value Ref Range Status   01/03/2023 2.78 0.70 - 3.10 10*3/mm3 Final     Monocytes, Absolute   Date Value Ref Range Status   01/03/2023 0.86 0.10 - 0.90 10*3/mm3 Final      Eosinophils, Absolute   Date Value Ref Range Status   01/03/2023 0.32 0.00 - 0.40 10*3/mm3 Final     Basophils, Absolute   Date Value Ref Range Status   01/03/2023 0.06 0.00 - 0.20 10*3/mm3 Final     nRBC   Date Value Ref Range Status   10/30/2019 0 0.0 - 0.0 % Final       Lab Results   Component Value Date    GLUCOSE 136 (H) 01/03/2023    BUN 11 01/03/2023    CREATININE 0.84 01/03/2023    EGFRIFNONA 100 12/15/2020    BCR 13.1 01/03/2023    K 3.8 01/03/2023    CO2 28.0 01/03/2023    CALCIUM 8.5 01/03/2023    ALBUMIN 3.6 01/03/2023    AST 28 01/03/2023    ALT 47 01/03/2023        Latest Reference Range & Units 01/03/23 08:39   Iron 59 - 158 mcg/dL 97   Ferritin 30.00 - 400.00 ng/mL 336.00   Iron Saturation 20 - 50 % 26   Transferrin 200 - 360 mg/dL 254   TIBC 298 - 536 mcg/dL 378       Assessment and Plan:    1. Obstructive sleep apnea - Established, stable (1)  1. Mostly compliant with PAP therapy - get updated compliance report once patient takes machine to DME provider  2. Continue PAP as prescribed  3. Script for PAP supplies  4. Pertinent labs were reviewed as listed above  5. Return to clinic in 4 weeks with compliance report unless change in symptoms in interim period  2. Restless leg syndrome/Periodic limb movement disorder (RLS/PLMD) - Established, stable (1)   1. No further workup needed at this time  3. Excessive daytime sleepiness - Established, stable (1)   1. Drowsy driving tips: do not drive if drowsy; pull over for frequent breaks if necessary  2. Extend total hours of sleep by at least 1 hour overall nightly  3. [place] sleep/wake times  4. Discussed medications with side effects of increased drowsiness - some are not modifiable  5. Could consider wake-promoting agent if necessary  6. Address after extending total hours of sleep and total hours of nightly CPAP usage  4. Nicotine dependence without complication - Established, stable (1)  1. Smoking cessation information provided. Smoking and  tobacco use cessation counseling visit was 3.5 minutes.       I spent 20 minutes caring for Serafin on this date of service. This time includes time spent by me in the following activities: preparing for the visit, reviewing tests, obtaining and/or reviewing a separately obtained history, performing a medically appropriate examination and/or evaluation , counseling and educating the patient/family/caregiver, documenting information in the medical record and care coordination; discussing PAP therapy, PAP compliance and PAP maintenance    RTC in 1 month. Patient agrees to return sooner if changes in symptoms.        This document has been electronically signed by IGNACIA Castillo on January 17, 2023 14:36 CST          CC: Karolyn Chew APRN          No ref. provider found

## 2023-01-17 NOTE — PATIENT INSTRUCTIONS
Drowsy driving tips:  Do not drive if drowsy.  Pull over for frequent breaks.  Talk over car phone or listen to engaging audio to help stay awake if it is necessary to drive.

## 2023-02-08 DIAGNOSIS — I10 BENIGN ESSENTIAL HTN: ICD-10-CM

## 2023-02-08 DIAGNOSIS — E78.00 ELEVATED LOW DENSITY LIPOPROTEIN (LDL) CHOLESTEROL LEVEL: ICD-10-CM

## 2023-02-08 DIAGNOSIS — E55.9 VITAMIN D DEFICIENCY: ICD-10-CM

## 2023-02-08 NOTE — TELEPHONE ENCOUNTER
Patient called requesting refill on atorvastatin, metoprolol, and vitamin D. Patients pharmacy is [facility] in [place].

## 2023-02-10 RX ORDER — METOPROLOL SUCCINATE 25 MG/1
25 TABLET, EXTENDED RELEASE ORAL DAILY
Qty: 30 TABLET | Refills: 5 | Status: SHIPPED | OUTPATIENT
Start: 2023-02-10

## 2023-02-10 RX ORDER — ATORVASTATIN CALCIUM 10 MG/1
10 TABLET, FILM COATED ORAL DAILY
Qty: 30 TABLET | Refills: 5 | Status: SHIPPED | OUTPATIENT
Start: 2023-02-10

## 2023-02-24 DIAGNOSIS — K21.9 GASTROESOPHAGEAL REFLUX DISEASE WITHOUT ESOPHAGITIS: ICD-10-CM

## 2023-02-24 RX ORDER — PANTOPRAZOLE SODIUM 40 MG/1
40 TABLET, DELAYED RELEASE ORAL DAILY
Qty: 30 TABLET | Refills: 5 | Status: SHIPPED | OUTPATIENT
Start: 2023-02-24

## 2023-02-24 NOTE — TELEPHONE ENCOUNTER
Patient contacted the office requesting refill on pantoprazole requested to [facility] in [place].Last refilled by C Walden, per provider Karolyn Maldonado Jeevan this is appropriate to fill.

## 2023-05-31 ENCOUNTER — OFFICE VISIT (OUTPATIENT)
Dept: FAMILY MEDICINE CLINIC | Facility: CLINIC | Age: 58
End: 2023-05-31

## 2023-05-31 VITALS
SYSTOLIC BLOOD PRESSURE: 132 MMHG | BODY MASS INDEX: 33.21 KG/M2 | HEIGHT: 71 IN | DIASTOLIC BLOOD PRESSURE: 88 MMHG | HEART RATE: 71 BPM | WEIGHT: 237.2 LBS | OXYGEN SATURATION: 96 %

## 2023-05-31 DIAGNOSIS — M25.511 CHRONIC RIGHT SHOULDER PAIN: Primary | ICD-10-CM

## 2023-05-31 DIAGNOSIS — G89.29 CHRONIC RIGHT SHOULDER PAIN: Primary | ICD-10-CM

## 2023-05-31 DIAGNOSIS — R35.0 URINARY FREQUENCY: ICD-10-CM

## 2023-05-31 DIAGNOSIS — R32 URINARY INCONTINENCE, UNSPECIFIED TYPE: ICD-10-CM

## 2023-05-31 DIAGNOSIS — Z13.89 SKIN CONDITION SCREENING: ICD-10-CM

## 2023-05-31 PROCEDURE — 1160F RVW MEDS BY RX/DR IN RCRD: CPT | Performed by: NURSE PRACTITIONER

## 2023-05-31 PROCEDURE — 3075F SYST BP GE 130 - 139MM HG: CPT | Performed by: NURSE PRACTITIONER

## 2023-05-31 PROCEDURE — 3079F DIAST BP 80-89 MM HG: CPT | Performed by: NURSE PRACTITIONER

## 2023-05-31 PROCEDURE — 1159F MED LIST DOCD IN RCRD: CPT | Performed by: NURSE PRACTITIONER

## 2023-05-31 PROCEDURE — 99214 OFFICE O/P EST MOD 30 MIN: CPT | Performed by: NURSE PRACTITIONER

## 2023-05-31 NOTE — PROGRESS NOTES
Subjective   Serafin Nash is a 58 y.o. male who presents to the office complaining of several issues today.  The first he says that he has several new places to come up on his skin, some are raised and some have changed in color and shape.  Has several tattoos.  Is out in the sun quite a bit.  Is also having right shoulder pain over the last several months.  Has been told he has a bone spur in his shoulder but unsure of which side.  Does see Ortho NP Jerri galvez but has not in several months.  Has had cervical spine surgery by Dr. Breen.    Third he is having urinary incontinence episodes.  Tells me that if he does not go when he gets the urge to urinate he will not make it and he does urinate on himself.  Tells me that this occurs daily and has been over the past several months.  PSA drawn in January of this year was normal.  Tells me that his father had his prostate removed possibly due to cancer.  Says that he drinks a minimum of 6 Dr. Peppers a day, starts drinking them as soon as he gets up in the morning.  Is not interested in drinking Dr. Pepper 0 or diet Dr. Pepper.  On a side note does go to [facility] for his behavioral health medications.  Sees a Makayla Jeff again who writes for [employer].  Also tells me that she has given samples of Rexulti as he has had episodes of zoning out.  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  Uses a CPAP at nighttime.  Was seeing [facility] prior to her departure.  Admits he smokes marijuana.  History of Present Illness     The following portions of the patient's history were reviewed and updated as appropriate: allergies, current medications, past family history, past medical history, past social history, past surgical history and problem list.    Review of Systems   Constitutional: Negative for chills, fatigue and fever.   HENT: Negative for congestion, sneezing, sore throat and  trouble swallowing.    Eyes: Negative for visual disturbance.   Respiratory: Negative for cough, chest tightness, shortness of breath and wheezing.    Cardiovascular: Negative for chest pain, palpitations and leg swelling.   Gastrointestinal: Negative for abdominal pain, constipation, diarrhea, nausea and vomiting.   Genitourinary: Positive for frequency and urgency. Negative for dysuria.   Musculoskeletal: Positive for arthralgias. Negative for neck pain.   Skin: Positive for rash.   Neurological: Negative for dizziness, weakness and headaches.   Psychiatric/Behavioral:        In the past two weeks the pt has not felt down, depressed, hopeless or lost interest in doing things   All other systems reviewed and are negative.      Objective   Physical Exam  Vitals and nursing note reviewed.   Constitutional:       General: He is not in acute distress.     Appearance: He is well-developed. He is not ill-appearing.   HENT:      Head: Normocephalic and atraumatic.      Right Ear: External ear normal.      Left Ear: External ear normal.      Nose: Nose normal.      Mouth/Throat:      Lips: Pink.      Mouth: Mucous membranes are moist.      Pharynx: Oropharynx is clear. Uvula midline.   Eyes:      General: Lids are normal. Vision grossly intact. No scleral icterus.        Right eye: No discharge.         Left eye: No discharge.      Extraocular Movements: Extraocular movements intact.      Conjunctiva/sclera: Conjunctivae normal.      Pupils: Pupils are equal, round, and reactive to light.   Neck:      Thyroid: No thyromegaly.   Cardiovascular:      Rate and Rhythm: Normal rate and regular rhythm.      Heart sounds: Normal heart sounds. No murmur heard.    No friction rub. No gallop.   Pulmonary:      Effort: Pulmonary effort is normal. No respiratory distress.      Breath sounds: Normal breath sounds. No wheezing or rales.   Abdominal:      General: Bowel sounds are normal. There is no distension.      Palpations: Abdomen  is soft.      Tenderness: There is no abdominal tenderness. There is no guarding or rebound.   Musculoskeletal:      Right shoulder: Tenderness present. Decreased range of motion.      Cervical back: Normal range of motion and neck supple.   Lymphadenopathy:      Cervical: No cervical adenopathy.   Skin:     General: Skin is warm and dry.      Capillary Refill: Capillary refill takes less than 2 seconds.      Findings: No rash.      Comments: Multiple raised and nonraised, darkened areas over his skin   Neurological:      General: No focal deficit present.      Mental Status: He is alert and oriented to person, place, and time.      GCS: GCS eye subscore is 4. GCS verbal subscore is 5. GCS motor subscore is 6.      Cranial Nerves: No cranial nerve deficit.   Psychiatric:         Behavior: Behavior normal. Behavior is cooperative.         Thought Content: Thought content normal.         Judgment: Judgment normal.         Assessment & Plan   Diagnoses and all orders for this visit:    1. Chronic right shoulder pain (Primary)  -     XR Shoulder 2+ View Right    2. Skin condition screening  -     Ambulatory Referral to Dermatology    3. Urinary frequency  -     US Bladder Volume; Future    4. Urinary incontinence, unspecified type  -     US Bladder Volume; Future    We will send him for x-ray today.  Is aware they will call him for a bladder scan.  May need urology follow-up depending on those results.  We will send him off to dermatology for further evaluation.               This document has been electronically signed by IGNACIA Bustamante on May 31, 2023 10:56 CDT

## 2023-06-07 ENCOUNTER — TELEPHONE (OUTPATIENT)
Dept: FAMILY MEDICINE CLINIC | Facility: CLINIC | Age: 58
End: 2023-06-07
Payer: COMMERCIAL

## 2023-06-07 DIAGNOSIS — M25.511 CHRONIC RIGHT SHOULDER PAIN: Primary | ICD-10-CM

## 2023-06-07 DIAGNOSIS — G89.29 CHRONIC RIGHT SHOULDER PAIN: Primary | ICD-10-CM

## 2023-06-07 NOTE — TELEPHONE ENCOUNTER
Camila Sloan MA  6/7/2023  4:45 PM CDT   Contacted the patient and informed him of the message. He stated understanding and thanked me. Referral has been placed as patient is agreeable.    Camila Sloan MA  6/2/2023  5:51 PM CDT   Attempted to contact the patient, no answer. Left voice message to contact the office when possible.    Camila Sloan MA  6/1/2023 12:16 PM CDT   Attempted to contact the patient, no answer. Left voice message to contact the office when possible.    Le Valdez MA  5/31/2023  4:21 PM CDT   Left VM to contact office for results.    IGNACIA Bustamante  5/31/2023  1:25 PM CDT   Please inform that his x-ray right shoulder shows mild degenerative changes, ultrasound also shows calcification in the region of his rotator cuff.  We can have him see Jerri galvez if he would like.

## 2023-07-26 ENCOUNTER — OFFICE VISIT (OUTPATIENT)
Dept: ORTHOPEDIC SURGERY | Facility: CLINIC | Age: 58
End: 2023-07-26
Payer: COMMERCIAL

## 2023-07-26 VITALS — BODY MASS INDEX: 31.64 KG/M2 | WEIGHT: 226 LBS | HEIGHT: 71 IN

## 2023-07-26 DIAGNOSIS — M75.101 ROTATOR CUFF SYNDROME OF RIGHT SHOULDER: ICD-10-CM

## 2023-07-26 DIAGNOSIS — M25.511 CHRONIC RIGHT SHOULDER PAIN: Primary | ICD-10-CM

## 2023-07-26 DIAGNOSIS — M75.41 IMPINGEMENT SYNDROME OF RIGHT SHOULDER: ICD-10-CM

## 2023-07-26 DIAGNOSIS — M19.019 AC JOINT ARTHROPATHY: ICD-10-CM

## 2023-07-26 DIAGNOSIS — G89.29 CHRONIC RIGHT SHOULDER PAIN: Primary | ICD-10-CM

## 2023-07-26 DIAGNOSIS — R29.898 WEAKNESS OF SHOULDER: ICD-10-CM

## 2023-07-26 DIAGNOSIS — M25.619 LIMITED RANGE OF MOTION (ROM) OF SHOULDER: ICD-10-CM

## 2023-07-26 RX ORDER — BREXPIPRAZOLE 1 MG/1
1 TABLET ORAL DAILY
COMMUNITY
Start: 2023-06-22

## 2023-07-26 RX ORDER — MELOXICAM 15 MG/1
15 TABLET ORAL DAILY
Qty: 30 TABLET | Refills: 2 | Status: SHIPPED | OUTPATIENT
Start: 2023-07-26

## 2023-07-26 RX ORDER — LIDOCAINE HYDROCHLORIDE 10 MG/ML
2 INJECTION, SOLUTION INFILTRATION; PERINEURAL
Status: COMPLETED | OUTPATIENT
Start: 2023-07-26 | End: 2023-07-26

## 2023-07-26 RX ORDER — TRIAMCINOLONE ACETONIDE 40 MG/ML
40 INJECTION, SUSPENSION INTRA-ARTICULAR; INTRAMUSCULAR
Status: COMPLETED | OUTPATIENT
Start: 2023-07-26 | End: 2023-07-26

## 2023-07-26 RX ADMIN — TRIAMCINOLONE ACETONIDE 40 MG: 40 INJECTION, SUSPENSION INTRA-ARTICULAR; INTRAMUSCULAR at 09:36

## 2023-07-26 RX ADMIN — LIDOCAINE HYDROCHLORIDE 2 ML: 10 INJECTION, SOLUTION INFILTRATION; PERINEURAL at 09:36

## 2023-07-26 NOTE — PROGRESS NOTES
Serafin Nash is a 58 y.o. male   Primary provider:  Karolyn Chew APRN       Chief Complaint   Patient presents with    Right Shoulder - Pain, Initial Evaluation     HISTORY OF PRESENT ILLNESS:    History of Present Illness  58-year-old male patient presents to office for evaluation of chronic right shoulder pain.  Initial onset of pain occurred several months ago per the patient's report with no initial injury or incident.  Patient states his right shoulder pain has continued to progressively worsen and has been much worse in the past couple of months.  Patient was evaluated by his primary care provider, IGNACIA Myers on 5/31/2023 with x-rays performed of the right shoulder.  He was referred to orthopedics for further evaluation and management.  Pain is described as constant and moderate to severe.  Pain is described as aching, burning, stabbing and grinding in nature with associated popping sensations and limited range of motion.  Pain is worse with driving and movement/use of his right arm/shoulder.  Patient also reports nighttime pain that disrupts his sleep.  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  Patient has tried rest/activity modification and oral NSAIDs (ibuprofen) with minimal to no improvement.  Current pain scale is 6/10.     CONCURRENT MEDICAL HISTORY:    Past Medical History:   Diagnosis Date    Alcohol abuse     Anxiety     Arthritis     Benign essential HTN 5/6/2022    Chronic midline low back pain with bilateral sciatica 5/6/2022    Depression     GERD (gastroesophageal reflux disease)     Headache     Low back pain     Polycythemia vera 5/6/2022    Stomach ulcer        No Known Allergies      Current Outpatient Medications:     aspirin (aspirin) 81 MG EC tablet, Take 1 tablet by mouth Daily. As secondary prevention of another heart attack and due to polycythemia vera., Disp: 90 tablet, Rfl: 3    atorvastatin (LIPITOR) 10 MG  tablet, Take 1 tablet by mouth Daily. For cholesterol, Disp: 30 tablet, Rfl: 5    DULoxetine (CYMBALTA) 60 MG capsule, Take 1 capsule by mouth 2 (Two) Times a Day., Disp: , Rfl:     lamoTRIgine (LaMICtal) 25 MG tablet, Take 4 tablets by mouth Every Morning., Disp: , Rfl:     metoprolol succinate XL (TOPROL-XL) 25 MG 24 hr tablet, Take 1 tablet by mouth Daily., Disp: 30 tablet, Rfl: 5    pantoprazole (PROTONIX) 40 MG EC tablet, Take 1 tablet by mouth Daily., Disp: 30 tablet, Rfl: 5    Rexulti 1 MG tablet, Take 1 mg by mouth Daily., Disp: , Rfl:     ziprasidone (GEODON) 40 MG capsule, Take 2 capsules by mouth 2 (Two) Times a Day., Disp: , Rfl:     cholecalciferol (VITAMIN D3) 1.25 MG (13108 UT) capsule, Take 1 capsule by mouth Every 7 (Seven) Days. Low vitamin D level (Patient not taking: Reported on 7/26/2023), Disp: 12 capsule, Rfl: 1    meloxicam (MOBIC) 15 MG tablet, Take 1 tablet by mouth Daily., Disp: 30 tablet, Rfl: 2    Past Surgical History:   Procedure Laterality Date    APPENDECTOMY      FOOT SURGERY      LAMINECTOMY  08/04/2022    NECK SURGERY  2012    THUMB CARPOMETACARPAL JOINT ARTHROPLASTY FLEXOR CARPI RADIALIS TENDON      TONSILLECTOMY         Family History   Problem Relation Age of Onset    Arthritis Mother     Asthma Mother     Diabetes Mother     Hypertension Mother     Alcohol abuse Father     Hypertension Father     Anxiety disorder Sister     Arthritis Sister     Asthma Sister     Mental illness Sister     Alcohol abuse Brother     Hypertension Brother     Stroke Brother     Stroke Maternal Grandmother         Social History     Socioeconomic History    Marital status:    Tobacco Use    Smoking status: Every Day     Packs/day: 1.00     Years: 50.00     Pack years: 50.00     Types: Cigarettes    Smokeless tobacco: Never   Substance and Sexual Activity    Alcohol use: Yes     Alcohol/week: 12.0 standard drinks     Types: 12 Cans of beer per week    Drug use: No    Sexual activity: Defer     "    Review of Systems   Constitutional: Negative.    HENT: Negative.     Eyes: Negative.    Respiratory: Negative.     Cardiovascular: Negative.    Gastrointestinal: Negative.    Endocrine: Negative.    Genitourinary: Negative.    Musculoskeletal:  Positive for arthralgias and myalgias.        Right shoulder pain.   Skin: Negative.    Allergic/Immunologic: Negative.    Neurological: Negative.    Hematological: Negative.    Psychiatric/Behavioral:  Positive for sleep disturbance.      PHYSICAL EXAMINATION:       Ht 180.3 cm (71\")   Wt 103 kg (226 lb)   BMI 31.52 kg/m²     Physical Exam  Vitals reviewed.   Constitutional:       General: He is not in acute distress.     Appearance: He is well-developed. He is not ill-appearing.   HENT:      Head: Normocephalic.   Pulmonary:      Effort: Pulmonary effort is normal. No respiratory distress.   Abdominal:      General: There is no distension.      Palpations: Abdomen is soft.   Musculoskeletal:         General: Tenderness (Right shoulder) present. No swelling, deformity or signs of injury.   Skin:     General: Skin is warm and dry.      Capillary Refill: Capillary refill takes less than 2 seconds.      Findings: No erythema.   Neurological:      Mental Status: He is alert and oriented to person, place, and time.      GCS: GCS eye subscore is 4. GCS verbal subscore is 5. GCS motor subscore is 6.   Psychiatric:         Speech: Speech normal.         Behavior: Behavior normal.         Thought Content: Thought content normal.         Judgment: Judgment normal.       GAIT:     []  Normal  []  Antalgic    Assistive device: [x]  None  []  Walker     []  Crutches  []  Cane     []  Wheelchair  []  Stretcher    Right Shoulder Exam     Tenderness   The patient is experiencing tenderness in the biceps tendon, acromioclavicular joint and acromion.    Range of Motion   Active abduction:  90   Passive abduction:  120   External rotation:  80   Forward flexion:  100   Internal rotation " 90 degrees:  50     Muscle Strength   Abduction: 4/5   Supraspinatus: 4/5     Tests   Troncoso test: positive  Cross arm: positive  Impingement: positive  Drop arm: negative    Other   Erythema: absent  Sensation: normal  Pulse: present    Comments:  Pain and limitations with range of motion in all planes, especially abduction and internal rotation. Tenderness to the anterior shoulder/joint line, biceps insertion and lateral shoulder at the distal acromion. No swelling appreciated. No warmth or erythema. No signs of infection noted. Empty can test significantly positive. Full can test significantly positive. Impingement sign significantly positive. Neurovascularly intact.       XR Shoulder 2+ View Right   Study Result    Narrative & Impression   FINDINGS:  Images of the right shoulder show mild degenerative change of the AC joint.  There is also some calcification in the region of the rotator cuff tendon,  possibly representing calcific tendinitis.     Otherwise negative.  Negative for fracture or other acute abnormality.       COMPREHENSIVE METABOLIC PANEL              Component  Ref Range & Units 7 d ago  (7/19/23)  [facility] 6 mo ago  (1/3/23)  [facility] 1 yr ago  (7/26/22)  [facility] 1 yr ago  (5/18/22)  [facility] 1 yr ago  (5/17/22)  [facility] 1 yr ago  (5/16/22)  [facility] 1 yr ago  (5/15/22)  [facility]   Glucose  70 - 110 mg/dL 100 136 High  R 140 High  R 110 High  R 133 High  R 90 R 96 R   BUN  7 - 18 mg/dL 8 11 R 10 R 9 R 13 R 14 R 12 R   Creatinine  0.7 - 1.3 mg/dL 0.9 0.84 R 0.88 R 0.7 0.7 0.8 0.9   Sodium  136 - 145 mmol/L 135 Low  138 R 140 R 138 135 Low  136 138   Potassium  3.5 - 5.1 mmol/L 3.7 3.8 R 4.0 R 3.7 3.4 Low  3.9 4.3   Chloride  98 - 107 mmol/L 98 103 R 101 R 105 102 103 106   CO2 Content  21 - 32 mmol/L 35 High          Calcium  8.5 - 10.1 mg/dL 9.2 8.5 R 9.1 R 8.4 Low  R 8.2 Low  R 8.5 Low  R 8.3 Low  R   Total Protein  6.4 - 8.2 g/dL 7.1 6.6 R         Albumin  3.4 - 5.0 g/dL 3.6 3.6 R        Total Bilirubin  0.20 - 1.00 mg/dL 1.00 0.6 R        AST (SGOT)  15 - 37 U/L 25 28 R        ALT (SGPT)  16 - 63 U/L 52 47 R        Alkaline Phosphatase  46 - 116 U/L 119 High  111 R        Est GFR by Clearance  mL/min 98   107   CM 99 CM   Comment:    GFR    WITH KIDNEY DAMAGE     W/O DAMAGE  >=90         STAGE 1            NORMAL  60-89        STAGE 2            DEC GFR  30-59        STAGE 3            STAGE 3  15-29        STAGE 4            STAGE 4  <15          STAGE 5            STAGE 5      The formula is valid only for adults between age 18 and 70.   Resulting Agency [facility]              Specimen Collected: 07/19/23 13:17 EDT Last Resulted: 07/19/23 13:50 EDT   Received From: [facility]  Result Received: 07/26/23 10:02           ASSESSMENT:    Diagnoses and all orders for this visit:    Chronic right shoulder pain  -     Large Joint Arthrocentesis: R subacromial bursa  -     MRI Shoulder Right Without Contrast; Future    AC joint arthropathy  -     Large Joint Arthrocentesis: R subacromial bursa  -     MRI Shoulder Right Without Contrast; Future    Limited range of motion (ROM) of shoulder  -     Large Joint Arthrocentesis: R subacromial bursa  -     MRI Shoulder Right Without Contrast; Future    Rotator cuff syndrome of right shoulder  -     Large Joint Arthrocentesis: R subacromial bursa  -     MRI Shoulder Right Without Contrast; Future    Impingement syndrome of right shoulder  -     Large Joint Arthrocentesis: R subacromial bursa  -     MRI Shoulder Right Without Contrast; Future    Weakness of shoulder  -     MRI Shoulder Right Without Contrast; Future    Other orders  -     meloxicam (MOBIC) 15 MG tablet; Take 1 tablet by mouth Daily.      Large Joint  Arthrocentesis: R subacromial bursa  Date/Time: 7/26/2023 9:36 AM  Consent given by: patient  Timeout: Immediately prior to procedure a time out was called to verify the correct patient, procedure, equipment, support staff and site/side marked as required   Supporting Documentation  Indications: pain and diagnostic evaluation   Procedure Details  Location: shoulder - R subacromial bursa  Preparation: Patient was prepped and draped in the usual sterile fashion  Needle size: 22 G  Approach: posterior  Medications administered: 40 mg triamcinolone acetonide 40 MG/ML; 2 mL lidocaine 1 %  Patient tolerance: patient tolerated the procedure well with no immediate complications    PLAN    X-rays of the right shoulder previously performed on 5/31/2023 as ordered by his PCP are independently reviewed by myself today with no acute findings noted.  Patient has evidence of mild to moderate degenerative changes noted at the AC joint.  No significant degenerative changes are noted at the glenohumeral joint.  There is also some calcification noted to the lateral aspect of the superior humeral head suspicious for calcifications along the rotator cuff tendons consistent with possible calcific tendinitis.  Patient complains of chronic right shoulder pain for the past several months with no initial injury or incident.  The pain has continued to progressively worsen.  He has pain with movement and use of his right arm/shoulder with limited range of motion.  On physical exam, he has pain that is reproduced with motion of the shoulder joint essentially in all planes, but especially with abduction and internal rotation.  Physical exam and special testing are consistent with rotator cuff pathology and/or impingement.  Recommend proceeding today with a trial of a subacromial injection of steroid into the right shoulder for management of pain/inflammation.    Recommend proceeding with MRI imaging of the right shoulder to evaluate for rotator  cuff tears, tendinitis/tendinosis, bursitis and/or impingement.  We discussed that the MRI will help to offer more definitive diagnosis and help to guide further treatment recommendations, including both surgical and nonsurgical treatment options.    Recommend the following:     -Rest and activity modification with avoidance of heavy lifting, pulling, tugging and repetitive motions for now.  -Gentle, progressive range of motion exercises with the affected shoulder intermittently several times daily as pain allows.  -Ice therapy to the affected shoulder intermittently 3-4 times daily for 15 minutes at a time to minimize pain/inflammation.  -Gradual progression of activity and use of the affected arm/shoulder as tolerated and based on pain/symptoms.     Recommend starting a prescription oral NSAID for improved control of pain/inflammation. Meloxicam is prescribed today. Patient is instructed to take the medication daily as needed. Patient is instructed to hold/discontinue the Ibuprofen he was previously taking. Patient is instructed to take the medication with food to minimize any potential GI upset. Patient is instructed not to take any additional NSAIDs, such as Ibuprofen or Aleve, while taking Meloxicam. Patient can also take Tylenol as needed for additional pain control.  I have reviewed the patient's recent labs on file from 7/19/2023 and he is noted to have normal renal functions with a GFR of 98.    Follow-up after MRI to discuss results and further treatment recommendations.  Consider referral to physical therapy.  Consider surgical consult if indicated.    Time spent of a minimum of 30 minutes including the face to face evaluation, reviewing of medical history and prior medial records, reviewing of diagnostic studies, ordering additional tests, prescription drug management, documentation, patient education and coordination of care.      EMR Dragon/Zura! Disclaimer: Some of this note may be an  electronic transcription/translation of spoken language to printed text using the Dragon Dictation System.      Return for follow up after MRI.       This document has been electronically signed by IGNACIA Tong on July 26, 2023 11:42 CDT       IGNACIA Tong

## 2023-09-28 DIAGNOSIS — M19.019 AC JOINT ARTHROPATHY: ICD-10-CM

## 2023-09-28 DIAGNOSIS — M25.511 CHRONIC RIGHT SHOULDER PAIN: ICD-10-CM

## 2023-09-28 DIAGNOSIS — M25.619 LIMITED RANGE OF MOTION (ROM) OF SHOULDER: ICD-10-CM

## 2023-09-28 DIAGNOSIS — M75.41 IMPINGEMENT SYNDROME OF RIGHT SHOULDER: ICD-10-CM

## 2023-09-28 DIAGNOSIS — G89.29 CHRONIC RIGHT SHOULDER PAIN: ICD-10-CM

## 2023-09-28 DIAGNOSIS — R29.898 WEAKNESS OF SHOULDER: ICD-10-CM

## 2023-09-28 DIAGNOSIS — M75.101 ROTATOR CUFF SYNDROME OF RIGHT SHOULDER: ICD-10-CM
